# Patient Record
Sex: FEMALE | Race: WHITE | NOT HISPANIC OR LATINO | Employment: OTHER | URBAN - METROPOLITAN AREA
[De-identification: names, ages, dates, MRNs, and addresses within clinical notes are randomized per-mention and may not be internally consistent; named-entity substitution may affect disease eponyms.]

---

## 2016-12-06 LAB
CHOL/HDLC RATIO: 3.5
CHOLEST SERPL-MSCNC: 226 MG/DL (ref 0–200)
HDLC SERPL-MCNC: 64 MG/DL
LDLC SERPL CALC-MCNC: 130 MG/DL
NON HDL CHOL. (LDL+VLDL): 162
TRIGL SERPL-MCNC: 159 MG/DL

## 2017-04-10 ENCOUNTER — TELEPHONE (OUTPATIENT)
Dept: FAMILY MEDICINE | Facility: CLINIC | Age: 72
End: 2017-04-10

## 2017-04-10 NOTE — TELEPHONE ENCOUNTER
----- Message from Sneha Hernadez sent at 4/10/2017  1:35 PM CDT -----  Contact: diana   Wants orders for labs faxed to [facility] or pt for upcoming  physical   Call back [phone]

## 2017-04-11 ENCOUNTER — PATIENT OUTREACH (OUTPATIENT)
Dept: ADMINISTRATIVE | Facility: HOSPITAL | Age: 72
End: 2017-04-11

## 2017-04-11 NOTE — LETTER
April 11, 2017    Tiffanie Black  [address]  [facility] [address]             [facility]  [address]  [facility] [address]  Phone: [phone] Dear Mrs. Black:    [facility] is committed to your overall health.  To help you get the most out of each of your visits, we will review your information to make sure you are up to date on all of your recommended tests and/or procedures.      Dr. Dionisio Soares is a new provider to us and we may not have your complete medical records on file here at [facility].  We have requested his records from the [facility].  The records we have received so far show that you may be due for your fasting cholesterol labs, a mammogram, colon cancer screening, hepatitis C screening, and possibly some immunizations (flu, pneumonia, and shingles).    Medicare does not cover all immunizations to be given in the clinic.  Check your benefits to ensure that you do not need to receive your immunizations at the pharmacy.    If you have had any of the above done at another facility, please bring the records or information with you so that your record at [facility] will be complete.    If you are currently taking medication, please bring it with you to your appointment for review.    Also, if you have any type of Advanced Directives, please bring them with you to your office visit so we may scan them into your chart.    If you have any questions or concerns, please don't hesitate to call.    Thank you for letting us care for you,  Amena Robins LPN Clinical Care Coordinator  [facility] [place]  [phone]

## 2017-04-17 RX ORDER — MONTELUKAST SODIUM 10 MG/1
10 TABLET ORAL NIGHTLY
COMMUNITY
End: 2017-04-25

## 2017-04-17 RX ORDER — CHOLECALCIFEROL (VITAMIN D3) 50 MCG
2000 TABLET ORAL DAILY
COMMUNITY

## 2017-04-17 RX ORDER — GLUCOSAMINE HCL 500 MG
100 TABLET ORAL DAILY
COMMUNITY
End: 2020-07-07

## 2017-04-17 RX ORDER — IBUPROFEN 200 MG
1200 CAPSULE ORAL DAILY
COMMUNITY
End: 2021-03-29

## 2017-04-17 RX ORDER — GUAIFENESIN 600 MG/1
400 TABLET, EXTENDED RELEASE ORAL 2 TIMES DAILY
COMMUNITY
End: 2018-08-21

## 2017-04-25 ENCOUNTER — OFFICE VISIT (OUTPATIENT)
Dept: FAMILY MEDICINE | Facility: CLINIC | Age: 72
End: 2017-04-25
Payer: MEDICARE

## 2017-04-25 VITALS
TEMPERATURE: 98 F | SYSTOLIC BLOOD PRESSURE: 125 MMHG | HEART RATE: 73 BPM | OXYGEN SATURATION: 94 % | HEIGHT: 64 IN | DIASTOLIC BLOOD PRESSURE: 80 MMHG | BODY MASS INDEX: 22.43 KG/M2 | WEIGHT: 131.38 LBS

## 2017-04-25 DIAGNOSIS — E78.2 MIXED HYPERLIPIDEMIA: ICD-10-CM

## 2017-04-25 DIAGNOSIS — E53.8 VITAMIN B12 DEFICIENCY: ICD-10-CM

## 2017-04-25 DIAGNOSIS — J30.2 SEASONAL ALLERGIC RHINITIS, UNSPECIFIED ALLERGIC RHINITIS TRIGGER: ICD-10-CM

## 2017-04-25 DIAGNOSIS — E55.9 VITAMIN D INSUFFICIENCY: ICD-10-CM

## 2017-04-25 DIAGNOSIS — R03.0 PRE-HYPERTENSION: ICD-10-CM

## 2017-04-25 DIAGNOSIS — E03.9 HYPOTHYROIDISM, UNSPECIFIED TYPE: ICD-10-CM

## 2017-04-25 DIAGNOSIS — M85.80 OSTEOPENIA, UNSPECIFIED LOCATION: ICD-10-CM

## 2017-04-25 DIAGNOSIS — R73.01 IMPAIRED FASTING GLUCOSE: ICD-10-CM

## 2017-04-25 PROCEDURE — 99213 OFFICE O/P EST LOW 20 MIN: CPT | Mod: PBBFAC,PO | Performed by: INTERNAL MEDICINE

## 2017-04-25 PROCEDURE — 99999 PR PBB SHADOW E&M-EST. PATIENT-LVL III: CPT | Mod: PBBFAC,,, | Performed by: INTERNAL MEDICINE

## 2017-04-25 PROCEDURE — 99214 OFFICE O/P EST MOD 30 MIN: CPT | Mod: S$PBB,,, | Performed by: INTERNAL MEDICINE

## 2017-04-25 RX ORDER — CETIRIZINE HYDROCHLORIDE 10 MG/1
10 TABLET ORAL NIGHTLY
COMMUNITY
End: 2018-01-19

## 2017-04-25 RX ORDER — UBIDECARENONE 75 MG
500 CAPSULE ORAL DAILY
COMMUNITY

## 2017-04-25 NOTE — MR AVS SNAPSHOT
[facility]  [address]  [place] [address]  Phone: [phone]  Fax: [phone]                  Tiffanie Black   2017 10:40 AM   Office Visit    Description:  Female : 1945   Provider:  Dionisio Soares MD   Department:  [facility]           Reason for Visit     Establish Care           Diagnoses this Visit        Comments    Mixed hyperlipidemia         Hypothyroidism, unspecified type         Impaired fasting glucose         Osteopenia, unspecified location         Vitamin D insufficiency         Vitamin B12 deficiency                To Do List           Future Appointments        Provider Department Dept Phone    2017 1:00 PM Dionisio Soares MD [facility] [phone]      Goals (5 Years of Data)     None      Follow-Up and Disposition     Return in about 6 weeks (around 2017) for Reassessment and go over her labs.    Follow-up and Disposition History      [facility]     [facility] Care [facility] -  Assistance  Unless otherwise directed by your provider, please contact [facility], our nurse care line that is available for  assistance.     Registered nurses in the [facility] provide: appointment scheduling, clinical advisement, health education, and other advisory services.  Call: [phone] (toll free)               Medications           Message regarding Medications     Verify the changes and/or additions to your medication regime listed below are the same as discussed with your clinician today.  If any of these changes or additions are incorrect, please notify your healthcare provider.        STOP taking these medications     montelukast (SINGULAIR) 10 mg tablet Take 10 mg by mouth every evening.           Verify that the below list of medications is an accurate representation of the medications you are currently taking.  If none reported, the list may be blank.  "If incorrect, please contact your healthcare provider. Carry this list with you in case of emergency.           Current Medications     calcium citrate (CALCITRATE) 200 mg (950 mg) tablet Take 1,200 mg by mouth once daily.     cetirizine (ZYRTEC) 10 MG tablet Take 10 mg by mouth every evening.    coenzyme Q10 10 mg capsule Take 100 mg by mouth once daily.     cyanocobalamin 500 MCG tablet Take 500 mcg by mouth once daily.    guaifenesin (MUCINEX) 600 mg 12 hr tablet Take 400 mg by mouth 2 (two) times daily. As needed for congestion     KRILL OIL ORAL Take 500 mg by mouth once daily.     vitamin D 1000 units Tab Take 2,000 mg by mouth once daily.           Clinical Reference Information           Your Vitals Were     BP Pulse Temp Height Weight SpO2    125/80 (BP Location: Left arm, Patient Position: Sitting, BP Method: Manual) 73 98.4 °F (36.9 °C) (Oral) 5' 4" (1.626 m) 59.6 kg (131 lb 6.3 oz) 94%    BMI                22.55 kg/m2          Blood Pressure          Most Recent Value    BP  125/80      Allergies as of 4/25/2017     Bactrim [Sulfamethoxazole-trimethoprim]    Biaxin [Clarithromycin]      Immunizations Administered on Date of Encounter - 4/25/2017     None      Orders Placed During Today's Visit     Future Labs/Procedures Expected by Expires    Calcium  4/25/2017 6/24/2018    Glucose, fasting  4/25/2017 6/24/2018    Hemoglobin A1c  4/25/2017 6/24/2018    Magnesium  4/25/2017 6/24/2018    T3, free  4/25/2017 6/24/2018    T4, free  4/25/2017 6/24/2018    TSH  4/25/2017 6/24/2018    Urinalysis  4/25/2017 6/24/2018    Vitamin B12  4/25/2017 6/24/2018    Vitamin D  4/25/2017 6/24/2018      [facility] Sign-Up     Activating your [facility] account is as easy as 1-2-3!     1) Visit [email or web address], select Sign Up Now, enter this activation code and your date of birth, then select Next.  [email or web address]  Expires: 6/9/2017 10:33 AM      2) Create a username and password to use when you visit [facility] in the future " and select a security question in case you lose your password and select Next.    3) Enter your e-mail address and click Sign Up!    Additional Information  If you have questions, please e-mail [email or web address] or call [phone] to talk to our [facility] staff. Remember, [facility] is NOT to be used for urgent needs. For medical emergencies, dial 911.         Language Assistance Services     ATTENTION: Language assistance services are available, free of charge. Please call [phone].      ATENCIÓN: Si habla español, tiene a lozada disposición servicios gratuitos de asistencia lingüística. Llame al [phone].     CHÚ Ý: N?u b?n nói Ti?ng Vi?t, có các d?ch v? h? tr? ngôn ng? mi?n phí dành cho b?n. G?i s? [phone].         [facility] complies with applicable Federal civil rights laws and does not discriminate on the basis of race, color, national origin, age, disability, or sex.

## 2017-04-25 NOTE — PROGRESS NOTES
Subjective:       Patient ID: Tiffanie Black is a 71 y.o. female.    Chief Complaint: Establish Care    HPI patient's a very pleasant 71-year-old established lady patient of mine here to get re-established with me at [facility].  Last documented office visit 12/1/16 with me.  Running problems at that time included mixed hyperlipidemia, impaired fasting glucose, osteopenia with vitamin D insufficiency, hypothyroidism, and B12 deficiency.  Hemoglobin A1c on 12/1/16 was 5.1.  UA dipstick on same date was clear with negative nitrite negative blood negative glucose and negative protein.  Labs reviewed.  Last DEXA 10/31/16 femoral neck T score remains -1.3 recommended repeat in 2 years.  11/29/16 lipid panel revealed the following results: Cholesterol 226, HDL 64, triglyceride 159, and .  Patient was advised to adhere to a low-fat high-fiber diet better.    11/11/16 labs revealed 25-hydroxy vitamin D 39; CBC within normal limits; borderline TSH 2.83; free T4 0.7; free T3 2.4; LFTs were normal; fasting blood sugar was 106, slightly elevated; GFR was estimated at 83; with a BUN 19 and creatinine 0.73; potassium was 3.9; iron/ ferritin levels were normal; B12 was 362; patient has started  500 µg per day of B12.    Review of Systems   Constitutional: Negative for appetite change, fatigue, fever and unexpected weight change.   HENT:         history of seasonal ALLERGIES / perennial ALLERGIES   Eyes: Negative for discharge and itching.   Respiratory: Negative for cough, chest tightness, shortness of breath and wheezing.    Cardiovascular: Negative for chest pain, palpitations and leg swelling.   Gastrointestinal: Negative for abdominal distention, abdominal pain, blood in stool, constipation, diarrhea, nausea and vomiting.        Denies melena as well   Endocrine:        HLP; hypothyroid. IFBS   Genitourinary: Negative for dysuria, hematuria and urgency.   Musculoskeletal: Positive for arthralgias.  "Negative for myalgias.        LBP; has seen Dr Gamble in [place]; switched to Dr Teresa; arthritis w spondylosis; surg declined by pt. Conservative mgt. Scoliosis; has had injections patricio once.   Skin: Negative for rash.   Allergic/Immunologic: Positive for environmental allergies. Negative for food allergies and immunocompromised state.        Denies seasonal or perennial ALLERGIES.   Neurological: Negative for tremors, seizures, syncope and weakness.   Hematological: Negative for adenopathy. Does not bruise/bleed easily.   Psychiatric/Behavioral:        Denies anxiety or depression       Objective:      Vitals:    04/25/17 1055   BP: 125/80   BP Location: Left arm   Patient Position: Sitting   BP Method: Manual   Pulse: 73   Temp: 98.4 °F (36.9 °C)   TempSrc: Oral   SpO2: (!) 94%   Weight: 59.6 kg (131 lb 6.3 oz)   Height: 5' 4" (1.626 m)     Body mass index is 22.55 kg/m².    Physical Exam   Constitutional: She is oriented to person, place, and time. She appears well-developed and well-nourished.   HENT:   Head: Normocephalic and atraumatic.   Eyes: EOM are normal.   Neck: Normal range of motion. Neck supple. No thyromegaly present.   No carotid bruits heard.   Cardiovascular: Normal rate, regular rhythm and normal heart sounds.  Exam reveals no gallop.    No murmur heard.  Pulmonary/Chest: Effort normal and breath sounds normal. No respiratory distress. She has no wheezes. She has no rales.   Abdominal: Soft. Bowel sounds are normal. She exhibits no distension. There is no tenderness. There is no rebound and no guarding.   Musculoskeletal: Normal range of motion. She exhibits no edema.   Lymphadenopathy:     She has no cervical adenopathy.   Neurological: She is alert and oriented to person, place, and time.   Moves all 4 extremities fine.   Skin: No rash noted.   Psychiatric: She has a normal mood and affect. Her behavior is normal. Thought content normal.   Vitals reviewed.      Assessment:       1. Mixed " hyperlipidemia    2. Hypothyroidism, unspecified type    3. Impaired fasting glucose    4. Osteopenia, unspecified location    5. Vitamin D insufficiency    6. Vitamin B12 deficiency     7.     Seasonal ALLERGIES   Plan:       Mixed hyperlipidemia. Maintain low fat high fiber diet, exercise regularly. Cont krill oil.          Hypothyroidism, unspecified type; cont present regimen dosing.  -     TSH; Future; Expected date: 4/25/17  -     T4, free; Future; Expected date: 4/25/17  -     T3, free; Future; Expected date: 4/25/17    Impaired fasting glucose. Exercise weight bearing  -     Hemoglobin A1c; Future; Expected date: 4/25/17  -     Urinalysis; Future; Expected date: 4/25/17  -     Glucose, fasting; Future; Expected date: 4/25/17    Osteopenia, unspecified location; weight bearing exercise. Cont ca w vit D supplements.         DEXA due after 10/31/18.  -     Vitamin D; Future; Expected date: 4/25/17  -     Calcium; Future; Expected date: 4/25/17  -     Magnesium; Future; Expected date: 4/25/17    Vitamin D insufficiency; cont same regimen.  -     Vitamin D; Future; Expected date: 4/25/17  -     Calcium; Future; Expected date: 4/25/17    Vitamin B12 deficiency; cont 500 mcg a day.         -     Vitamin B12; Future; Expected date: 4/25/17    Seasonal ALLERGIES; uses Zyrtec and Mucinex

## 2017-04-27 PROBLEM — R03.0 PRE-HYPERTENSION: Status: ACTIVE | Noted: 2017-04-27

## 2017-05-24 PROBLEM — C50.812 MALIGNANT NEOPLASM OF OVERLAPPING SITES OF LEFT FEMALE BREAST: Status: ACTIVE | Noted: 2017-05-24

## 2017-05-30 ENCOUNTER — PATIENT OUTREACH (OUTPATIENT)
Dept: ADMINISTRATIVE | Facility: HOSPITAL | Age: 72
End: 2017-05-30

## 2017-05-30 NOTE — LETTER
May 30, 2017    Tiffanie LaguerreJovitatyra Black  [address]  [facility] [address]             [facility]  [address]  [facility] [address]  Phone: [phone] Dear Mrs. Black:    [facility] committed to your overall health.  To help you get the most out of each of your visits, we will review your information to make sure you are up to date on all of your recommended tests and/or procedures.      Dr. Dionisio Soares has found that you may be due for colon cancer screening, hepatitis C screening, and possibly pneumonia and shingles immunizations.     Medicare does not cover all immunizations to be given in the clinic.  Check your benefits to ensure that you do not need to receive your immunizations at the pharmacy.    If you have had any of the above done at another facility, please bring the records or information with you so that your record at [facility] will be complete.  If you would like to schedule any of these, please contact me.    If you are currently taking medication, please bring it with you to your appointment for review.    Also, if you have any type of Advanced Directives, please bring them with you to your office visit so we may scan them into your chart.    If you have any questions or concerns, please don't hesitate to call.    Thank you for letting us care for you,  Amena Robins LPN Clinical Care Coordinator  [facility] [place]  [phone]

## 2017-06-09 ENCOUNTER — PATIENT OUTREACH (OUTPATIENT)
Dept: ADMINISTRATIVE | Facility: HOSPITAL | Age: 72
End: 2017-06-09

## 2017-06-09 NOTE — LETTER
June 9, 2017    Tiffanie LaguerreJovitatyra Black  [address]  [facility] [address]             [facility]  [address]  [facility] [address]  Phone: [phone] Dear Mrs. Black:    [facility] committed to your overall health.  To help you get the most out of each of your visits, we will review your information to make sure you are up to date on all of your recommended tests and/or procedures.      Dr. Dionisio Soares has found that you may be due for colon cancer screening, hepatitis C screening, and possibly pneumonia and shingles immunizations.     Medicare does not cover all immunizations to be given in the clinic.  Check your benefits to ensure that you do not need to receive your immunizations at the pharmacy.    If you have had any of the above done at another facility, please bring the records or information with you so that your record at [facility] will be complete.  If you would like to schedule any of these, please contact me.    If you are currently taking medication, please bring it with you to your appointment for review.    Also, if you have any type of Advanced Directives, please bring them with you to your office visit so we may scan them into your chart.    If you have any questions or concerns, please don't hesitate to call.    Thank you for letting us care for you,  Amena Robins LPN Clinical Care Coordinator  [facility] [place] [place]  [phone]

## 2017-06-23 ENCOUNTER — OFFICE VISIT (OUTPATIENT)
Dept: FAMILY MEDICINE | Facility: CLINIC | Age: 72
End: 2017-06-23
Payer: MEDICARE

## 2017-06-23 VITALS
HEART RATE: 68 BPM | HEIGHT: 64 IN | WEIGHT: 131.31 LBS | BODY MASS INDEX: 22.42 KG/M2 | DIASTOLIC BLOOD PRESSURE: 78 MMHG | TEMPERATURE: 98 F | SYSTOLIC BLOOD PRESSURE: 130 MMHG

## 2017-06-23 DIAGNOSIS — R73.01 IMPAIRED FASTING GLUCOSE: ICD-10-CM

## 2017-06-23 DIAGNOSIS — Z86.010 HISTORY OF COLON POLYPS: ICD-10-CM

## 2017-06-23 DIAGNOSIS — M85.80 OSTEOPENIA, UNSPECIFIED LOCATION: ICD-10-CM

## 2017-06-23 DIAGNOSIS — E53.8 VITAMIN B12 DEFICIENCY: ICD-10-CM

## 2017-06-23 DIAGNOSIS — E03.9 HYPOTHYROIDISM, UNSPECIFIED TYPE: ICD-10-CM

## 2017-06-23 DIAGNOSIS — J30.2 SEASONAL ALLERGIC RHINITIS, UNSPECIFIED ALLERGIC RHINITIS TRIGGER: ICD-10-CM

## 2017-06-23 DIAGNOSIS — E55.9 VITAMIN D INSUFFICIENCY: ICD-10-CM

## 2017-06-23 DIAGNOSIS — E78.2 MIXED HYPERLIPIDEMIA: Primary | ICD-10-CM

## 2017-06-23 DIAGNOSIS — R03.0 PRE-HYPERTENSION: ICD-10-CM

## 2017-06-23 PROCEDURE — 99214 OFFICE O/P EST MOD 30 MIN: CPT | Mod: S$PBB,,, | Performed by: INTERNAL MEDICINE

## 2017-06-23 PROCEDURE — 1126F AMNT PAIN NOTED NONE PRSNT: CPT | Mod: ,,, | Performed by: INTERNAL MEDICINE

## 2017-06-23 PROCEDURE — 99213 OFFICE O/P EST LOW 20 MIN: CPT | Mod: PBBFAC,PO | Performed by: INTERNAL MEDICINE

## 2017-06-23 PROCEDURE — 1159F MED LIST DOCD IN RCRD: CPT | Mod: ,,, | Performed by: INTERNAL MEDICINE

## 2017-06-23 PROCEDURE — 99999 PR PBB SHADOW E&M-EST. PATIENT-LVL III: CPT | Mod: PBBFAC,,, | Performed by: INTERNAL MEDICINE

## 2017-06-23 RX ORDER — LEVOTHYROXINE SODIUM 50 UG/1
50 TABLET ORAL DAILY
Qty: 30 TABLET | Refills: 2 | Status: SHIPPED | OUTPATIENT
Start: 2017-06-23 | End: 2017-09-19

## 2017-06-23 NOTE — PROGRESS NOTES
Subjective:       Patient ID: Tiffanie Black is a 71 y.o. female.    Chief Complaint: Follow-up    HPI  Here today for reassessment and to go over her labs. For HLP, on low fat high fiber diet; not exercising; on krill oil. Following thyroid levels w early hypothyroidism; on no meds. Pre-HTN; watches her salt intake. Seasonal allergies have been doing ok w meds. Brittle nails; no hair loss; some fatigue; dry skin, but no constipation. Labs reviewed w pt at length.  DEXA scan noted 10/31/16, needs repeat in 2 years    Review of Systems   Constitutional: Negative for appetite change, fever and unexpected weight change.   HENT: Positive for sinus pressure. Negative for postnasal drip and rhinorrhea.          history of seasonal ALLERGIES / perennial ALLERGIES; mucinex, zyrtec, flonase; all help.   Eyes: Negative for discharge and itching.   Respiratory: Negative for cough, chest tightness, shortness of breath and wheezing.    Cardiovascular: Negative for chest pain, palpitations and leg swelling.   Gastrointestinal: Negative for abdominal distention, abdominal pain, blood in stool, constipation, diarrhea, nausea and vomiting.        Denies melena as well   Endocrine: Negative for polydipsia, polyphagia and polyuria.   Genitourinary: Negative for dysuria, hematuria and urgency.   Musculoskeletal: Negative for arthralgias and myalgias.   Skin: Negative for rash.   Allergic/Immunologic: Positive for environmental allergies. Negative for food allergies and immunocompromised state.        Denies seasonal or perennial ALLERGIES.   Neurological: Negative for tremors, seizures and syncope.   Hematological: Negative for adenopathy. Does not bruise/bleed easily.   Psychiatric/Behavioral:        Denies anxiety or depression       Objective:      Vitals:    06/23/17 1311   BP: 130/78   BP Location: Right arm   Patient Position: Sitting   Pulse: 68   Temp: 98.3 °F (36.8 °C)   TempSrc: Oral   Weight: 59.5 kg (131 lb 4.5 oz)  "  Height: 5' 4" (1.626 m)     Body mass index is 22.53 kg/m².    Physical Exam   Constitutional: She is oriented to person, place, and time. She appears well-developed and well-nourished.   HENT:   Head: Normocephalic and atraumatic.   Eyes: EOM are normal.   Neck: Normal range of motion. Neck supple. No thyromegaly present.   No carotid bruits heard.   Cardiovascular: Normal rate, regular rhythm and normal heart sounds.  Exam reveals no gallop.    No murmur heard.  Pulmonary/Chest: Effort normal and breath sounds normal. No respiratory distress. She has no wheezes. She has no rales.   Abdominal: Soft. Bowel sounds are normal. She exhibits no distension. There is no tenderness. There is no rebound and no guarding.   Musculoskeletal: Normal range of motion. She exhibits no edema.   Lymphadenopathy:     She has no cervical adenopathy.   Neurological: She is alert and oriented to person, place, and time.   Moves all 4 extremities fine.   Skin: No rash noted.   Psychiatric: She has a normal mood and affect. Her behavior is normal. Thought content normal.   Vitals reviewed.      Assessment:       1. Mixed hyperlipidemia    2. Hypothyroidism, unspecified type    3. Pre-hypertension    4. Impaired fasting glucose    5. History of colon polyps    6. Seasonal allergic rhinitis, unspecified allergic rhinitis trigger    7. Osteopenia, unspecified location    8. Vitamin D insufficiency    9. Vitamin B12 deficiency        Plan:       Mixed hyperlipidemia. Maintain low fat high fiber diet, exercise regularly.    Hypothyroidism, unspecified type; following TFT's periodically.  Begin levothyroxine 50 µg a day due to hypothyroidism and some mild symptoms.    Pre-hypertension. Limit salt intake; follow BP periodically.    Impaired fasting glucose. Exercise recommended with weight reduction and low carb diet; we'll follow hemoglobin A1c's with you periodically.    History of colon polyps; TC 7/29/15 divert ds desc/sigmoid; 5 yr f/u; " due 2020. High fiber.    Seasonal allergic rhinitis, unspecified allergic rhinitis trigger; meds control.    Osteopenia, unspecified location. Weight bearing exercises needed 5x a week;, continue calcium and vit D supplements. DEXA 10/31/18 due;    Vitamin D insufficiency; and tinea vitamin D supplementation; will check levels    Vitamin B12 deficiency; on B12 supplements 500 µg daily

## 2017-06-23 NOTE — PATIENT INSTRUCTIONS
Add levothyroxine 50 mcg a day; obtain labs few days before follow-up. Fast the night before. Labs at [facility].

## 2017-07-01 ENCOUNTER — TELEPHONE (OUTPATIENT)
Dept: FAMILY MEDICINE | Facility: CLINIC | Age: 72
End: 2017-07-01

## 2017-07-02 NOTE — TELEPHONE ENCOUNTER
Informed patient she is to take the Synthroid/levothyroxine on an empty stomach with no other food or medicines till at least an hour later.

## 2017-09-05 ENCOUNTER — PATIENT OUTREACH (OUTPATIENT)
Dept: ADMINISTRATIVE | Facility: HOSPITAL | Age: 72
End: 2017-09-05

## 2017-09-05 NOTE — LETTER
September 11, 2017    Tiffanie Jovita Wayne  [address]  [facility] [address]             [facility]  [address]  [facility] [address]  Phone: [phone] Dear Mrs. Black:    We have tried to reach you by mychart unsuccessfully.    [facility] committed to your overall health.  To help you get the most out of each of your visits, we will review your information to make sure you are up to date on all of your recommended tests and/or procedures.       Dr. Dionisio Soares has found that you may be due for hepatitis C screening and possibly a shingles immunization.     Medicare does not cover all immunizations to be given in the clinic.  Check your benefits to ensure that you do not need to receive your immunizations at the pharmacy.     If you have had any of the above done at another facility, please bring the records or information with you so that your record at [facility] will be complete.  If you would like to schedule any of these, please contact me.     If you are currently taking medication, please bring it with you to your appointment for review.     Also, if you have any type of Advanced Directives, please bring them with you to your office visit so we may scan them into your chart.      If you have any questions or concerns, please don't hesitate to call.    Thank you for letting us care for you,  Amena Robins LPN Clinical Care Coordinator  [facility] [place] [facility] [place]  [phone]

## 2017-09-19 ENCOUNTER — OFFICE VISIT (OUTPATIENT)
Dept: FAMILY MEDICINE | Facility: CLINIC | Age: 72
End: 2017-09-19
Payer: MEDICARE

## 2017-09-19 VITALS
BODY MASS INDEX: 21.76 KG/M2 | TEMPERATURE: 98 F | HEART RATE: 84 BPM | DIASTOLIC BLOOD PRESSURE: 80 MMHG | SYSTOLIC BLOOD PRESSURE: 122 MMHG | WEIGHT: 127.44 LBS | OXYGEN SATURATION: 97 % | HEIGHT: 64 IN

## 2017-09-19 DIAGNOSIS — E55.9 VITAMIN D INSUFFICIENCY: ICD-10-CM

## 2017-09-19 DIAGNOSIS — E53.8 VITAMIN B12 DEFICIENCY: ICD-10-CM

## 2017-09-19 DIAGNOSIS — R03.0 PRE-HYPERTENSION: ICD-10-CM

## 2017-09-19 DIAGNOSIS — E78.2 MIXED HYPERLIPIDEMIA: Primary | ICD-10-CM

## 2017-09-19 DIAGNOSIS — E03.9 HYPOTHYROIDISM, UNSPECIFIED TYPE: ICD-10-CM

## 2017-09-19 DIAGNOSIS — M85.859 OSTEOPENIA OF THIGH, UNSPECIFIED LATERALITY: ICD-10-CM

## 2017-09-19 DIAGNOSIS — R73.01 IMPAIRED FASTING GLUCOSE: ICD-10-CM

## 2017-09-19 PROCEDURE — 99214 OFFICE O/P EST MOD 30 MIN: CPT | Mod: S$PBB,,, | Performed by: INTERNAL MEDICINE

## 2017-09-19 PROCEDURE — 99999 PR PBB SHADOW E&M-EST. PATIENT-LVL III: CPT | Mod: PBBFAC,,, | Performed by: INTERNAL MEDICINE

## 2017-09-19 PROCEDURE — 1126F AMNT PAIN NOTED NONE PRSNT: CPT | Mod: ,,, | Performed by: INTERNAL MEDICINE

## 2017-09-19 PROCEDURE — 1159F MED LIST DOCD IN RCRD: CPT | Mod: ,,, | Performed by: INTERNAL MEDICINE

## 2017-09-19 PROCEDURE — 99213 OFFICE O/P EST LOW 20 MIN: CPT | Mod: PBBFAC,PN | Performed by: INTERNAL MEDICINE

## 2017-09-19 RX ORDER — PRAVASTATIN SODIUM 20 MG/1
20 TABLET ORAL DAILY
Qty: 30 TABLET | Refills: 3 | Status: SHIPPED | OUTPATIENT
Start: 2017-09-19 | End: 2018-03-19

## 2017-09-19 RX ORDER — LEVOTHYROXINE SODIUM 75 UG/1
TABLET ORAL
Qty: 25 TABLET | Refills: 1 | Status: SHIPPED | OUTPATIENT
Start: 2017-09-19 | End: 2018-01-19

## 2017-09-19 RX ORDER — LEVOTHYROXINE SODIUM 50 UG/1
TABLET ORAL
Qty: 60 TABLET | Refills: 1 | Status: SHIPPED | OUTPATIENT
Start: 2017-09-19 | End: 2018-01-19

## 2017-09-19 NOTE — PROGRESS NOTES
Subjective:       Patient ID: Tiffanie Black is a 72 y.o. female.    Chief Complaint: follow up labs    HPI  Here to go over her labs. HLP; on low fat high fiber diet mostly. On krill oil.  10 year risk of cardiovascular diseases elevated at 10%;  with normal being less than 7.5%.  Discussed with patient at length.  We'll add 20 mg of pravastatin daily at bedtime to her medical regimen.  IFBS: exercise just starting. Hypothyroidism: takes her thyroid med appropriately. Pre-HTN: watches her salt intake; /80 in office. Avr same at home w SBP high 120's/upper 70's. Osteopenia; needs weight bearing exercises 5x a week for min 25-30 minutes; cont on ca w vit D , and separate vit D supplements. DEXA 10/31/16 reviewed w pt; 2 yr f/u.  Total time 1123 through 12:00 noon; various diagnosis is addressed as well as plan of care.    Review of Systems   Constitutional: Negative for activity change, appetite change, fever and unexpected weight change.   HENT: Negative for hearing loss, rhinorrhea and trouble swallowing.         Eyes history of seasonal ALLERGIES or perennial ALLERGIES   Eyes: Negative for discharge and visual disturbance.   Respiratory: Negative for cough, chest tightness, shortness of breath and wheezing.    Cardiovascular: Negative for chest pain, palpitations and leg swelling.   Gastrointestinal: Negative for abdominal distention, abdominal pain, blood in stool, constipation, diarrhea, nausea and vomiting.        Denies melena as well   Endocrine: Negative for polydipsia, polyphagia and polyuria.   Genitourinary: Negative for difficulty urinating, dysuria, hematuria and menstrual problem.   Musculoskeletal: Negative for arthralgias, joint swelling, myalgias and neck pain.   Skin: Negative for rash.   Allergic/Immunologic: Positive for environmental allergies. Negative for food allergies.        Denies seasonal or perennial ALLERGIES.   Neurological: Negative for tremors, seizures, syncope,  "weakness and headaches.   Hematological: Negative for adenopathy. Does not bruise/bleed easily.   Psychiatric/Behavioral: Negative for confusion and dysphoric mood.         Anxiety/situational lately; no depression.       Objective:      Vitals:    09/19/17 1051   BP: 122/80   BP Location: Right arm   Patient Position: Sitting   BP Method: Medium (Manual)   Pulse: 84   Temp: 98 °F (36.7 °C)   TempSrc: Oral   SpO2: 97%   Weight: 57.8 kg (127 lb 6.8 oz)   Height: 5' 4" (1.626 m)     Body mass index is 21.87 kg/m².    Physical Exam   Constitutional: She is oriented to person, place, and time. She appears well-developed and well-nourished.   HENT:   Head: Normocephalic and atraumatic.   Eyes: EOM are normal.   Neck: Normal range of motion. Neck supple. No thyromegaly present.   No carotid bruits heard.   Cardiovascular: Normal rate, regular rhythm and normal heart sounds.  Exam reveals no gallop.    No murmur heard.  Pulmonary/Chest: Effort normal and breath sounds normal. No respiratory distress. She has no wheezes. She has no rales.   Abdominal: Soft. Bowel sounds are normal. She exhibits no distension. There is no tenderness. There is no rebound and no guarding.   Musculoskeletal: Normal range of motion. She exhibits no edema.   Lymphadenopathy:     She has no cervical adenopathy.   Neurological: She is alert and oriented to person, place, and time.   Moves all 4 extremities fine.   Skin: No rash noted.   Psychiatric: She has a normal mood and affect. Her behavior is normal. Thought content normal.   Vitals reviewed.      Assessment:       1. Mixed hyperlipidemia    2. Pre-hypertension    3. Hypothyroidism, unspecified type    4. Impaired fasting glucose    5. Vitamin B12 deficiency    6. Osteopenia of thigh, unspecified laterality    7. Vitamin D insufficiency        Plan:       Mixed hyperlipidemia.  Increase cardiovascular disease risk at 10%; normal being less than 7.5%.  Moderate high intensity statin therapy " recommended however patient does not desire to take higher statin therapy.  Maintain low fat high fiber diet, exercise regularly. Add pravastatin 20 mg q hs; adhere to diet better; limit dairy more as well.    Pre-hypertension. Maintain < 2 Gm Na a day diet, and monitor BP at home; keep a log.    Hypothyroidism, unspecified type; decrease to levothyroxine 37.5 mcg MWF; 50 mcg other days.    Impaired fasting glucose. Exercise recommended  and low carb diet; we'll follow hemoglobin A1c's with you periodically.    Vitamin B12 deficiency; cont present dosing.    Osteopenia of thigh, unspecified laterality; Weight bearing exercises 5x a wee needed; continue calcium and vit D supplements. DEXA due 10/31/18    Vitamin D insufficiency; and continue calcium with vitamin D supplementation; 25-hydroxy vitamin D level ordered for follow-up

## 2017-09-19 NOTE — PATIENT INSTRUCTIONS
Mixed hyperlipidemia. Maintain low fat high fiber diet, exercise regularly. Add pravastatin 20 mg q hs; adhere to diet better; limit dairy more as well.    Pre-hypertension. Maintain < 2 Gm Na a day diet, and monitor BP at home; keep a log.    Hypothyroidism, unspecified type; decrease to levothyroxine 37.5 mcg MWF; 50 mcg other days.    Impaired fasting glucose. Exercise recommended  and low carb diet; we'll follow hemoglobin A1c's with you periodically.    Vitamin B12 deficiency; cont present dosing.    Osteopenia of thigh, unspecified laterality; Weight bearing exercises 5x a wee needed; continue calcium and vit D supplements. DEXA due 10/31/18    Vitamin D insufficiency    Return to office in 3 months; labs 10-14 days prior to follow up. Keep her follow up w Dr Morel next May, 2018 for breast cancer following. Sees also dr Moore in June yearly w [place]. And f/u.

## 2017-09-22 DIAGNOSIS — Z11.59 NEED FOR HEPATITIS C SCREENING TEST: ICD-10-CM

## 2018-01-04 ENCOUNTER — PATIENT MESSAGE (OUTPATIENT)
Dept: FAMILY MEDICINE | Facility: CLINIC | Age: 73
End: 2018-01-04

## 2018-01-08 ENCOUNTER — TELEPHONE (OUTPATIENT)
Dept: FAMILY MEDICINE | Facility: CLINIC | Age: 73
End: 2018-01-08

## 2018-01-08 NOTE — TELEPHONE ENCOUNTER
----- Message from Thea Herrera sent at 1/8/2018  2:59 PM CST -----  Contact: [phone]  Patient is requesting a call back from the nurse, in regards to her lab work.    Please call the patient upon request at phone number [phone].

## 2018-01-15 ENCOUNTER — LAB VISIT (OUTPATIENT)
Dept: LAB | Facility: HOSPITAL | Age: 73
End: 2018-01-15
Attending: INTERNAL MEDICINE
Payer: MEDICARE

## 2018-01-15 DIAGNOSIS — R73.01 IMPAIRED FASTING GLUCOSE: ICD-10-CM

## 2018-01-15 DIAGNOSIS — E03.9 HYPOTHYROIDISM, UNSPECIFIED TYPE: ICD-10-CM

## 2018-01-15 LAB
ESTIMATED AVG GLUCOSE: 100 MG/DL
GLUCOSE SERPL-MCNC: 93 MG/DL
HBA1C MFR BLD HPLC: 5.1 %
T3FREE SERPL-MCNC: 2.6 PG/ML
T4 FREE SERPL-MCNC: 1.06 NG/DL
TSH SERPL DL<=0.005 MIU/L-ACNC: 0.8 UIU/ML

## 2018-01-15 PROCEDURE — 83036 HEMOGLOBIN GLYCOSYLATED A1C: CPT

## 2018-01-15 PROCEDURE — 36415 COLL VENOUS BLD VENIPUNCTURE: CPT | Mod: PN

## 2018-01-15 PROCEDURE — 84439 ASSAY OF FREE THYROXINE: CPT

## 2018-01-15 PROCEDURE — 84481 FREE ASSAY (FT-3): CPT

## 2018-01-15 PROCEDURE — 82947 ASSAY GLUCOSE BLOOD QUANT: CPT

## 2018-01-15 PROCEDURE — 84443 ASSAY THYROID STIM HORMONE: CPT

## 2018-01-19 ENCOUNTER — OFFICE VISIT (OUTPATIENT)
Dept: FAMILY MEDICINE | Facility: CLINIC | Age: 73
End: 2018-01-19
Payer: MEDICARE

## 2018-01-19 VITALS
DIASTOLIC BLOOD PRESSURE: 80 MMHG | HEART RATE: 88 BPM | WEIGHT: 131.38 LBS | BODY MASS INDEX: 22.43 KG/M2 | TEMPERATURE: 98 F | SYSTOLIC BLOOD PRESSURE: 122 MMHG | OXYGEN SATURATION: 95 % | HEIGHT: 64 IN

## 2018-01-19 DIAGNOSIS — E55.9 VITAMIN D INSUFFICIENCY: ICD-10-CM

## 2018-01-19 DIAGNOSIS — E53.8 VITAMIN B12 DEFICIENCY: ICD-10-CM

## 2018-01-19 DIAGNOSIS — M85.859 OSTEOPENIA OF THIGH, UNSPECIFIED LATERALITY: ICD-10-CM

## 2018-01-19 DIAGNOSIS — R73.01 IMPAIRED FASTING GLUCOSE: ICD-10-CM

## 2018-01-19 DIAGNOSIS — E78.2 MIXED HYPERLIPIDEMIA: ICD-10-CM

## 2018-01-19 DIAGNOSIS — E03.9 HYPOTHYROIDISM, UNSPECIFIED TYPE: Primary | ICD-10-CM

## 2018-01-19 DIAGNOSIS — R03.0 PRE-HYPERTENSION: ICD-10-CM

## 2018-01-19 PROCEDURE — 99999 PR PBB SHADOW E&M-EST. PATIENT-LVL III: CPT | Mod: PBBFAC,,, | Performed by: INTERNAL MEDICINE

## 2018-01-19 PROCEDURE — 99213 OFFICE O/P EST LOW 20 MIN: CPT | Mod: PBBFAC,PN | Performed by: INTERNAL MEDICINE

## 2018-01-19 PROCEDURE — 99214 OFFICE O/P EST MOD 30 MIN: CPT | Mod: S$PBB,,, | Performed by: INTERNAL MEDICINE

## 2018-01-19 RX ORDER — LEVOTHYROXINE SODIUM 75 UG/1
TABLET ORAL
Qty: 30 TABLET | Refills: 2 | Status: SHIPPED | OUTPATIENT
Start: 2018-01-19 | End: 2018-09-07 | Stop reason: SDUPTHER

## 2018-01-19 NOTE — PATIENT INSTRUCTIONS
Hypothyroidism, unspecified type; same thyroid dosing; TFT in 2 mos; wants to decrease to 1/2 of 75 mcg daily.     Impaired fasting glucose. Exercise recommended with weight reduction and low carb diet; we'll follow hemoglobin A1c's with you periodically.    Mixed hyperlipidemia. Maintain low fat high fiber diet, exercise regularly. Pravastatin    Osteopenia of thigh, unspecified laterality. Weight bearing exercises, continue calcium and vit D supplements. DEXA at 10/31/18    Vitamin D insufficiency; cont supplements; therapeutic.    Pre-hypertension. Maintain < 2 Gm Na a day diet, and monitor BP at pharmacist.; keep a log.

## 2018-01-19 NOTE — PROGRESS NOTES
Subjective:       Patient ID: Tiffanie Black is a 72 y.o. female.    Chief Complaint: Follow-up (labs)    HPI  Overall doing fine. HLP; on low fat high fiber diet or tries; tolerates pravastatin fine and krill oil. Hypothyroid: takes appropriately. Pre-HTN: watches her salt intake. BP avr 125-130/70-80. 122/80 here. Osteopenia; not exercising; takes her calcium and vit D though.  12/28/17 vitamin D level returned back 43.  4/7/14 DEXA scan as compared to DEXA scan from 10/31/16: Lumbar spine T score improved to 0.2 up from -1.3.  Femoral T-scores remained same -1.3 both times.  Would repeat her DEXA scan after 10/31/18.  Labs reviewed discussed with patient at length as well as DEXA scans.  Total time to 2:50 p.m. to 3:25 PM.    Review of Systems   Constitutional: Negative for activity change and unexpected weight change.   HENT: Positive for congestion. Negative for hearing loss, rhinorrhea and trouble swallowing.         Saw ENT dr Sunshine today; to have nasal polyps removed.   Eyes: Negative for discharge and visual disturbance.   Respiratory: Negative for cough, chest tightness, shortness of breath and wheezing.    Cardiovascular: Negative for chest pain and palpitations.   Gastrointestinal: Negative for abdominal pain, blood in stool, constipation, diarrhea, nausea and vomiting.   Endocrine: Negative for polydipsia and polyuria.   Genitourinary: Negative for difficulty urinating, dysuria, hematuria and menstrual problem.   Musculoskeletal: Positive for arthralgias. Negative for joint swelling, myalgias and neck pain.        Lower back pain and upper occasionally. Has had lower back injections.   Skin: Negative for rash.   Allergic/Immunologic: Negative for environmental allergies and food allergies.   Neurological: Negative for syncope, weakness and headaches.   Hematological: Positive for adenopathy. Does not bruise/bleed easily.        L submandibular; ENT to address Dr Sunshine. Discussed w pt.  "  Psychiatric/Behavioral: Negative for confusion and dysphoric mood.        Denies anxiety or depression.       Objective:      Vitals:    01/19/18 1335   BP: 122/80   BP Location: Right arm   Patient Position: Sitting   BP Method: Medium (Manual)   Pulse: 88   Temp: 97.8 °F (36.6 °C)   TempSrc: Oral   SpO2: 95%   Weight: 59.6 kg (131 lb 6.3 oz)   Height: 5' 4" (1.626 m)     Body mass index is 22.55 kg/m².    Physical Exam   Constitutional: She is oriented to person, place, and time. She appears well-developed and well-nourished.   HENT:   Head: Normocephalic and atraumatic.   Eyes: EOM are normal.   Neck: Normal range of motion. Neck supple. No thyromegaly present.   Cardiovascular: Normal rate, regular rhythm and normal heart sounds.  Exam reveals no gallop.    No murmur heard.  Pulmonary/Chest: Effort normal and breath sounds normal. No respiratory distress. She has no wheezes. She has no rales.   Abdominal: Soft. Bowel sounds are normal. She exhibits no distension. There is no tenderness. There is no rebound and no guarding.   Musculoskeletal: Normal range of motion. She exhibits no edema.   Lymphadenopathy:     She has no cervical adenopathy.   Neurological: She is alert and oriented to person, place, and time.   Moves all 4 extremities fine.   Skin: No rash noted.   R submandibular LN palp; her ENT to address. No palp ant cervical or post cervical LN's appreciated.Throat pink; to slightly inflamed; no sore throat; no abnormal oral lesions noted.   Psychiatric: She has a normal mood and affect. Her behavior is normal. Thought content normal.   Vitals reviewed.      Assessment:       1. Hypothyroidism, unspecified type    2. Impaired fasting glucose    3. Mixed hyperlipidemia    4. Osteopenia of thigh, unspecified laterality    5. Vitamin D insufficiency    6. Pre-hypertension    7. Vitamin B12 deficiency        Plan:       Hypothyroidism, unspecified type; same thyroid dosing; TFT in 2 mos; wants to decrease " levothyroxin to 1/2 of 75 mcg daily.     Impaired fasting glucose. Exercise recommended with  low carb diet; we'll follow hemoglobin A1c's with you periodically.  Recent hemoglobin A1c showing good control at 5.1 with a fasting blood sugar 93 recently.    Mixed hyperlipidemia. Maintain low fat high fiber diet, exercise regularly.  Continue Pravastatin and krill oil; needs to watch her diet closer and exercise regularly for better lipid management.    Osteopenia of thigh, unspecified laterality. Weight bearing exercises, continue calcium and vit D supplements. DEXA at 10/31/18    Vitamin D insufficiency; cont supplements; therapeutic.    Pre-hypertension. Maintain < 2 Gm Na a day diet, and monitor BP at pharmacist.; keep a log.    B12 deficiency: B12 levels have been therapeutic on recent dosing was September 2017 levels; continue present supplements;     Check B12 levels on follow-up

## 2018-01-24 PROBLEM — J33.9 POLYP, NASAL: Status: ACTIVE | Noted: 2018-01-24

## 2018-03-12 ENCOUNTER — LAB VISIT (OUTPATIENT)
Dept: LAB | Facility: HOSPITAL | Age: 73
End: 2018-03-12
Attending: INTERNAL MEDICINE
Payer: MEDICARE

## 2018-03-12 DIAGNOSIS — E78.2 MIXED HYPERLIPIDEMIA: ICD-10-CM

## 2018-03-12 DIAGNOSIS — E03.9 HYPOTHYROIDISM, UNSPECIFIED TYPE: ICD-10-CM

## 2018-03-12 DIAGNOSIS — M85.859 OSTEOPENIA OF THIGH, UNSPECIFIED LATERALITY: ICD-10-CM

## 2018-03-12 LAB
ALBUMIN SERPL BCP-MCNC: 3.6 G/DL
ALP SERPL-CCNC: 75 U/L
ALT SERPL W/O P-5'-P-CCNC: 19 U/L
AST SERPL-CCNC: 22 U/L
BILIRUB DIRECT SERPL-MCNC: 0.2 MG/DL
BILIRUB SERPL-MCNC: 0.5 MG/DL
CHOLEST SERPL-MCNC: 225 MG/DL
CHOLEST/HDLC SERPL: 3.7 {RATIO}
HDLC SERPL-MCNC: 61 MG/DL
HDLC SERPL: 27.1 %
LDLC SERPL CALC-MCNC: 140.2 MG/DL
MAGNESIUM SERPL-MCNC: 2 MG/DL
NONHDLC SERPL-MCNC: 164 MG/DL
PROT SERPL-MCNC: 6.7 G/DL
T3FREE SERPL-MCNC: 2.5 PG/ML
T4 FREE SERPL-MCNC: 0.94 NG/DL
TRIGL SERPL-MCNC: 119 MG/DL
TSH SERPL DL<=0.005 MIU/L-ACNC: 1.36 UIU/ML

## 2018-03-12 PROCEDURE — 80076 HEPATIC FUNCTION PANEL: CPT

## 2018-03-12 PROCEDURE — 84439 ASSAY OF FREE THYROXINE: CPT

## 2018-03-12 PROCEDURE — 84443 ASSAY THYROID STIM HORMONE: CPT

## 2018-03-12 PROCEDURE — 83735 ASSAY OF MAGNESIUM: CPT

## 2018-03-12 PROCEDURE — 84481 FREE ASSAY (FT-3): CPT

## 2018-03-12 PROCEDURE — 36415 COLL VENOUS BLD VENIPUNCTURE: CPT | Mod: PN

## 2018-03-12 PROCEDURE — 80061 LIPID PANEL: CPT

## 2018-03-19 ENCOUNTER — OFFICE VISIT (OUTPATIENT)
Dept: FAMILY MEDICINE | Facility: CLINIC | Age: 73
End: 2018-03-19
Payer: MEDICARE

## 2018-03-19 VITALS
WEIGHT: 128.88 LBS | HEIGHT: 64 IN | DIASTOLIC BLOOD PRESSURE: 82 MMHG | BODY MASS INDEX: 22 KG/M2 | HEART RATE: 82 BPM | OXYGEN SATURATION: 96 % | SYSTOLIC BLOOD PRESSURE: 130 MMHG | TEMPERATURE: 98 F

## 2018-03-19 DIAGNOSIS — R73.01 IMPAIRED FASTING GLUCOSE: ICD-10-CM

## 2018-03-19 DIAGNOSIS — E55.9 VITAMIN D INSUFFICIENCY: ICD-10-CM

## 2018-03-19 DIAGNOSIS — E03.9 HYPOTHYROIDISM, UNSPECIFIED TYPE: ICD-10-CM

## 2018-03-19 DIAGNOSIS — R03.0 PRE-HYPERTENSION: ICD-10-CM

## 2018-03-19 DIAGNOSIS — M85.859 OSTEOPENIA OF THIGH, UNSPECIFIED LATERALITY: ICD-10-CM

## 2018-03-19 DIAGNOSIS — E78.2 MIXED HYPERLIPIDEMIA: Primary | ICD-10-CM

## 2018-03-19 PROCEDURE — 99214 OFFICE O/P EST MOD 30 MIN: CPT | Mod: PBBFAC,PN | Performed by: INTERNAL MEDICINE

## 2018-03-19 PROCEDURE — 99214 OFFICE O/P EST MOD 30 MIN: CPT | Mod: S$PBB,,, | Performed by: INTERNAL MEDICINE

## 2018-03-19 PROCEDURE — 99999 PR PBB SHADOW E&M-EST. PATIENT-LVL IV: CPT | Mod: PBBFAC,,, | Performed by: INTERNAL MEDICINE

## 2018-03-19 RX ORDER — PRAVASTATIN SODIUM 40 MG/1
40 TABLET ORAL NIGHTLY
Qty: 90 TABLET | Refills: 1 | Status: SHIPPED | OUTPATIENT
Start: 2018-03-19 | End: 2018-09-20 | Stop reason: SDUPTHER

## 2018-03-19 NOTE — PATIENT INSTRUCTIONS
Mixed hyperlipidemia; increase pravastatin to 40 mg each night. Maintain low fat high fiber diet, exercise regularly.    Hypothyroidism, unspecified type; same thyroid dosing    Impaired fasting glucose. Exercise recommended with weight reduction and low carb diet; we'll follow hemoglobin A1c's with you periodically.    Osteopenia of thigh, unspecified laterality. Weight bearing exercises, continue calcium and vit D supplements. DEXA scan at 2 yr intervals as needed.      DEXA due 10/31/18;     Vitamin D insufficiency; levels on f/u.    Pre-hypertension. Maintain < 2 Gm Na a day diet, and monitor BP periodically.; keep a log.

## 2018-03-19 NOTE — PROGRESS NOTES
Subjective:       Patient ID: Tiffanie Black is a 72 y.o. female.    Chief Complaint: Follow-up (labs)    HPI  Overall she's been doing well; has her nasal polyp patricio removed in end of January; Dr Olson ENT. Mix HLP; on low fat high fiber diet mostly, tolerates pravastatin fine. 10 yr ASCVD risk elevated at present at 11.0%. IFBS: watches her carbs mostly; hypothyroidism; takes her thyroid supplements appropriately. Osteopenia: weight bearing 3-4x a week; goal is 5x a wek at 30 min each. Takes her calcium w vit D. Pre-HTN: watches her salt intake of note; /82. 10/31/16 DEXA reviewed w pt; needs repeat in 2 yrs. Labs reviewed w pt; labs ordered for f/u; total time: 0168-1820 am; >50% time, spent, on counseling, review, and discussion.    Review of Systems   Constitutional: Negative for appetite change, fever and unexpected weight change.   HENT: Negative for congestion, postnasal drip, rhinorrhea and sinus pressure.    Eyes: Negative for discharge and itching.   Respiratory: Negative for cough, chest tightness, shortness of breath and wheezing.    Cardiovascular: Negative for chest pain, palpitations and leg swelling.   Gastrointestinal: Negative for abdominal distention, abdominal pain, blood in stool, constipation, diarrhea, nausea and vomiting.   Endocrine: Negative for polydipsia, polyphagia and polyuria.   Genitourinary: Negative for dysuria and hematuria.   Musculoskeletal: Negative for arthralgias and myalgias.   Skin: Negative for rash.   Allergic/Immunologic: Negative for environmental allergies and food allergies.   Neurological: Negative for tremors, seizures and syncope.   Hematological: Negative for adenopathy. Does not bruise/bleed easily.   Psychiatric/Behavioral:        Denies anxiety or depression       Objective:      Vitals:    03/19/18 1000   BP: 130/82   BP Location: Right arm   Patient Position: Sitting   BP Method: Medium (Manual)   Pulse: 82   Temp: 97.6 °F (36.4 °C)  "  TempSrc: Oral   SpO2: 96%   Weight: 58.4 kg (128 lb 13.7 oz)   Height: 5' 4" (1.626 m)     Body mass index is 22.12 kg/m².    Physical Exam   Constitutional: She is oriented to person, place, and time. She appears well-developed and well-nourished.   HENT:   Head: Normocephalic and atraumatic.   Eyes: EOM are normal.   Neck: Normal range of motion. Neck supple. No thyromegaly present.   Cardiovascular: Normal rate, regular rhythm and normal heart sounds.  Exam reveals no gallop.    No murmur heard.  Pulmonary/Chest: Effort normal and breath sounds normal. No respiratory distress. She has no wheezes. She has no rales.   Abdominal: Soft. Bowel sounds are normal. She exhibits no distension. There is no tenderness. There is no rebound and no guarding.   Musculoskeletal: Normal range of motion. She exhibits no edema.   Lymphadenopathy:     She has no cervical adenopathy.   Neurological: She is alert and oriented to person, place, and time.   Moves all 4 extremities fine.   Skin: No rash noted.   Psychiatric: She has a normal mood and affect. Her behavior is normal. Thought content normal.   Vitals reviewed.      Assessment:       1. Mixed hyperlipidemia    2. Hypothyroidism, unspecified type    3. Impaired fasting glucose    4. Osteopenia of thigh, unspecified laterality    5. Vitamin D insufficiency    6. Pre-hypertension        Plan:       Mixed hyperlipidemia; increase pravastatin to 40 mg each night. Maintain low fat high fiber diet, exercise regularly. Watch her diet closer.    Hypothyroidism, unspecified type; same thyroid dosing    Impaired fasting glucose. Exercise recommended with weight reduction and low carb diet; we'll follow hemoglobin A1c's with you periodically.    Osteopenia of thigh, unspecified laterality. Weight bearing exercises, continue calcium and vit D supplements. DEXA scan at 2 yr intervals as needed.      DEXA due 10/31/18;     Vitamin D insufficiency; levels on f/u.    Pre-hypertension. " Maintain < 2 Gm Na a day diet, and monitor BP periodically.; keep a log.

## 2018-07-12 ENCOUNTER — LAB VISIT (OUTPATIENT)
Dept: LAB | Facility: HOSPITAL | Age: 73
End: 2018-07-12
Attending: INTERNAL MEDICINE
Payer: MEDICARE

## 2018-07-12 DIAGNOSIS — M85.859 OSTEOPENIA OF THIGH, UNSPECIFIED LATERALITY: ICD-10-CM

## 2018-07-12 DIAGNOSIS — E55.9 VITAMIN D INSUFFICIENCY: ICD-10-CM

## 2018-07-12 DIAGNOSIS — E53.8 VITAMIN B12 DEFICIENCY: ICD-10-CM

## 2018-07-12 DIAGNOSIS — E78.2 MIXED HYPERLIPIDEMIA: ICD-10-CM

## 2018-07-12 DIAGNOSIS — R73.01 IMPAIRED FASTING GLUCOSE: ICD-10-CM

## 2018-07-12 LAB
25(OH)D3+25(OH)D2 SERPL-MCNC: 50 NG/ML
ALBUMIN SERPL BCP-MCNC: 4 G/DL
ALP SERPL-CCNC: 71 U/L
ALT SERPL W/O P-5'-P-CCNC: 22 U/L
ANION GAP SERPL CALC-SCNC: 11 MMOL/L
AST SERPL-CCNC: 21 U/L
BILIRUB SERPL-MCNC: 0.5 MG/DL
BUN SERPL-MCNC: 14 MG/DL
CALCIUM SERPL-MCNC: 9.8 MG/DL
CHLORIDE SERPL-SCNC: 103 MMOL/L
CHOLEST SERPL-MCNC: 215 MG/DL
CHOLEST/HDLC SERPL: 3.5 {RATIO}
CO2 SERPL-SCNC: 27 MMOL/L
CREAT SERPL-MCNC: 0.8 MG/DL
EST. GFR  (AFRICAN AMERICAN): >60 ML/MIN/1.73 M^2
EST. GFR  (NON AFRICAN AMERICAN): >60 ML/MIN/1.73 M^2
GLUCOSE SERPL-MCNC: 87 MG/DL
HDLC SERPL-MCNC: 61 MG/DL
HDLC SERPL: 28.4 %
LDLC SERPL CALC-MCNC: 127.6 MG/DL
MAGNESIUM SERPL-MCNC: 2 MG/DL
NONHDLC SERPL-MCNC: 154 MG/DL
POTASSIUM SERPL-SCNC: 3.7 MMOL/L
PROT SERPL-MCNC: 7.2 G/DL
SODIUM SERPL-SCNC: 141 MMOL/L
TRIGL SERPL-MCNC: 132 MG/DL
VIT B12 SERPL-MCNC: 531 PG/ML

## 2018-07-12 PROCEDURE — 80053 COMPREHEN METABOLIC PANEL: CPT

## 2018-07-12 PROCEDURE — 36415 COLL VENOUS BLD VENIPUNCTURE: CPT | Mod: PN

## 2018-07-12 PROCEDURE — 82306 VITAMIN D 25 HYDROXY: CPT

## 2018-07-12 PROCEDURE — 80061 LIPID PANEL: CPT

## 2018-07-12 PROCEDURE — 82607 VITAMIN B-12: CPT

## 2018-07-12 PROCEDURE — 83735 ASSAY OF MAGNESIUM: CPT

## 2018-07-19 ENCOUNTER — OFFICE VISIT (OUTPATIENT)
Dept: FAMILY MEDICINE | Facility: CLINIC | Age: 73
End: 2018-07-19
Payer: MEDICARE

## 2018-07-19 VITALS
WEIGHT: 131.19 LBS | BODY MASS INDEX: 22.52 KG/M2 | DIASTOLIC BLOOD PRESSURE: 84 MMHG | SYSTOLIC BLOOD PRESSURE: 130 MMHG | TEMPERATURE: 98 F | HEART RATE: 74 BPM

## 2018-07-19 DIAGNOSIS — E55.9 VITAMIN D INSUFFICIENCY: ICD-10-CM

## 2018-07-19 DIAGNOSIS — E78.2 MIXED HYPERLIPIDEMIA: Primary | ICD-10-CM

## 2018-07-19 DIAGNOSIS — E03.9 HYPOTHYROIDISM, UNSPECIFIED TYPE: ICD-10-CM

## 2018-07-19 DIAGNOSIS — Z78.0 POST-MENOPAUSAL: ICD-10-CM

## 2018-07-19 DIAGNOSIS — K21.9 GASTROESOPHAGEAL REFLUX DISEASE, ESOPHAGITIS PRESENCE NOT SPECIFIED: ICD-10-CM

## 2018-07-19 DIAGNOSIS — R07.9 CHEST PAIN, UNSPECIFIED TYPE: ICD-10-CM

## 2018-07-19 DIAGNOSIS — M85.859 OSTEOPENIA OF THIGH, UNSPECIFIED LATERALITY: ICD-10-CM

## 2018-07-19 DIAGNOSIS — J30.2 SEASONAL ALLERGIC RHINITIS, UNSPECIFIED TRIGGER: ICD-10-CM

## 2018-07-19 DIAGNOSIS — R73.01 IMPAIRED FASTING GLUCOSE: ICD-10-CM

## 2018-07-19 PROCEDURE — 93005 ELECTROCARDIOGRAM TRACING: CPT | Mod: PBBFAC,PN | Performed by: INTERNAL MEDICINE

## 2018-07-19 PROCEDURE — 99214 OFFICE O/P EST MOD 30 MIN: CPT | Mod: S$PBB,,, | Performed by: INTERNAL MEDICINE

## 2018-07-19 PROCEDURE — 99999 PR PBB SHADOW E&M-EST. PATIENT-LVL III: CPT | Mod: PBBFAC,,, | Performed by: INTERNAL MEDICINE

## 2018-07-19 PROCEDURE — 99213 OFFICE O/P EST LOW 20 MIN: CPT | Mod: PBBFAC,PN,25 | Performed by: INTERNAL MEDICINE

## 2018-07-19 PROCEDURE — 93010 ELECTROCARDIOGRAM REPORT: CPT | Mod: S$PBB,,, | Performed by: INTERNAL MEDICINE

## 2018-07-19 RX ORDER — OMEPRAZOLE 40 MG/1
40 CAPSULE, DELAYED RELEASE ORAL DAILY
Qty: 30 CAPSULE | Refills: 3 | Status: SHIPPED | OUTPATIENT
Start: 2018-07-19 | End: 2019-03-29 | Stop reason: ALTCHOICE

## 2018-07-19 NOTE — PATIENT INSTRUCTIONS
Mixed hyperlipidemia. Maintain low fat high fiber diet, exercise regularly. Cont pravastatin    Chest pain, unspecified type; ASA 81 mg a day; cardiology consult Dr Bautista to r/o CAD  -     IN OFFICE EKG 12-LEAD (to Muse)    Hypothyroidism, unspecified type; same thyroid dosing.     Impaired fasting glucose. Exercise recommended with weight reduction and low carb diet; we'll follow hemoglobin A1c's with you periodically.    Osteopenia of thigh, unspecified laterality. Weight bearing exercises, continue calcium and vit D supplements. DEXA scan at 2 yr intervals as needed.Due 10/31/18.    Vitamin D insufficiency; levels therapeutic; cont supplements.    Seasonal allergic rhinitis, unspecified trigger; claritin and flonase for congestion

## 2018-07-19 NOTE — PROGRESS NOTES
Subjective:       Patient ID: Tiffanie Black is a 72 y.o. female.    Chief Complaint: Follow-up    HPI  Overall doing fine.  Mix HLP: on low fat high fiber diet; tolerates pravastatin and krill oil fine.  IFBS: watches her carbs; exercises 3x a week; goal 5 at 30 minutes. FBS 87.  Hypothyroidism; takes her supplements appropriately.  Osteopenia; weight bearing exercises; takes her calcium w vit D. DEXA 10/31/18 Lsp T score  0.2, improved from -1.3;   femoral -1.3; previous same. Repeat in 2 yrs.   Chest pain; lower substernal nonradiating; no n/v, sweating or associated SOB w it. Lasts 5 minutes. Last 4 mos 3x note. Last episode 2 weeks ago. Nothing worsens; Rest leads to clears spontaneously. Belching a lot but not bad by pt's account. No anxiety issues. EST 5-6 yrs w Dr Combs; routine screen was negative. [facility] neg for CAD.  Will consult [facility] doctor Dr Bautista to r/o CAD; place ppt on PPI as well.  In office EKG showing no evidence of acute ischemia.    Review of Systems   Constitutional: Negative for activity change and unexpected weight change.   HENT: Negative for hearing loss, rhinorrhea and trouble swallowing.         Seasonal allergies.   Eyes: Negative for discharge and visual disturbance.   Respiratory: Positive for chest tightness. Negative for wheezing.    Cardiovascular: Positive for chest pain. Negative for palpitations.   Gastrointestinal: Negative for blood in stool, constipation, diarrhea and vomiting.   Endocrine: Negative for polydipsia and polyuria.   Genitourinary: Negative for difficulty urinating, dysuria, hematuria and menstrual problem.   Musculoskeletal: Positive for arthralgias and neck pain. Negative for joint swelling.   Allergic/Immunologic: Negative for environmental allergies and food allergies.   Neurological: Negative for weakness and headaches.   Psychiatric/Behavioral: Negative for confusion and dysphoric mood.       Objective:      Vitals:    07/19/18 1026    BP: 130/84   Pulse: 74   Temp: 97.8 °F (36.6 °C)   Weight: 59.5 kg (131 lb 2.8 oz)     Body mass index is 22.52 kg/m².    Physical Exam   Constitutional: She is oriented to person, place, and time. She appears well-developed and well-nourished.   HENT:   Head: Normocephalic and atraumatic.   Eyes: EOM are normal.   Neck: Normal range of motion. Neck supple. No thyromegaly present.   Cardiovascular: Normal rate, regular rhythm and normal heart sounds.  Exam reveals no gallop.    No murmur heard.  Pulmonary/Chest: Effort normal and breath sounds normal. No respiratory distress. She has no wheezes. She has no rales.   No costochondral tenderness to palp.   Abdominal: Soft. Bowel sounds are normal. She exhibits no distension. There is no tenderness. There is no rebound and no guarding.   Musculoskeletal: Normal range of motion. She exhibits no edema.   Lymphadenopathy:     She has no cervical adenopathy.   Neurological: She is alert and oriented to person, place, and time.   Moves all 4 extremities fine.   Skin: No rash noted.   Psychiatric: She has a normal mood and affect. Her behavior is normal. Thought content normal.   Vitals reviewed.      Assessment:       1. Mixed hyperlipidemia    2. Chest pain, unspecified type    3. Gastroesophageal reflux disease, esophagitis presence not specified    4. Hypothyroidism, unspecified type    5. Impaired fasting glucose    6. Osteopenia of thigh, unspecified laterality    7. Vitamin D insufficiency    8. Seasonal allergic rhinitis, unspecified trigger    9. Post-menopausal        Plan:       Mixed hyperlipidemia. Maintain low fat high fiber diet, exercise regularly. Cont pravastatin and Krill oil.    Chest pain, unspecified type; ASA 81 mg a day; cardiology consult Dr Bautista to r/o CAD; to ER if worsens.   -     IN OFFICE EKG 12-LEAD (to Muse)    GERD.No bedtime snacks. Omeprazole 40 mg p.o. daily; reassess on f/u.    Hypothyroidism, unspecified type; same thyroid dosing.   Thyroid function test on follow-up for reassessment    Impaired fasting glucose. Exercise recommended with  low carb diet; we'll follow hemoglobin A1c's with you periodically.    Osteopenia of thigh, unspecified laterality. Weight bearing exercises, continue calcium and vit D supplements. DEXA scan at 2 yr intervals as needed. Due 10/31/18.    Vitamin D insufficiency; levels therapeutic; cont supplements.  Postmenopausal.    Seasonal allergic rhinitis, unspecified trigger; claritin and flonase for congestion

## 2018-08-21 ENCOUNTER — CLINICAL SUPPORT (OUTPATIENT)
Dept: CARDIOLOGY | Facility: CLINIC | Age: 73
End: 2018-08-21
Attending: INTERNAL MEDICINE
Payer: MEDICARE

## 2018-08-21 ENCOUNTER — OFFICE VISIT (OUTPATIENT)
Dept: CARDIOLOGY | Facility: CLINIC | Age: 73
End: 2018-08-21
Payer: MEDICARE

## 2018-08-21 VITALS — WEIGHT: 132.06 LBS | HEIGHT: 64 IN | BODY MASS INDEX: 22.55 KG/M2 | HEART RATE: 72 BPM

## 2018-08-21 VITALS
HEART RATE: 91 BPM | BODY MASS INDEX: 22.55 KG/M2 | DIASTOLIC BLOOD PRESSURE: 88 MMHG | WEIGHT: 132.06 LBS | SYSTOLIC BLOOD PRESSURE: 136 MMHG | HEIGHT: 64 IN

## 2018-08-21 DIAGNOSIS — R03.0 PRE-HYPERTENSION: Primary | ICD-10-CM

## 2018-08-21 DIAGNOSIS — E78.2 MIXED HYPERLIPIDEMIA: ICD-10-CM

## 2018-08-21 DIAGNOSIS — R07.2 PRECORDIAL PAIN: ICD-10-CM

## 2018-08-21 DIAGNOSIS — R03.0 PRE-HYPERTENSION: ICD-10-CM

## 2018-08-21 PROCEDURE — 99204 OFFICE O/P NEW MOD 45 MIN: CPT | Mod: S$PBB,,, | Performed by: INTERNAL MEDICINE

## 2018-08-21 PROCEDURE — 93351 STRESS TTE COMPLETE: CPT | Mod: PBBFAC,PO | Performed by: INTERNAL MEDICINE

## 2018-08-21 PROCEDURE — 99999 PR PBB SHADOW E&M-EST. PATIENT-LVL II: CPT | Mod: PBBFAC,,,

## 2018-08-21 PROCEDURE — 99999 PR PBB SHADOW E&M-EST. PATIENT-LVL III: CPT | Mod: PBBFAC,,, | Performed by: INTERNAL MEDICINE

## 2018-08-21 PROCEDURE — 99212 OFFICE O/P EST SF 10 MIN: CPT | Mod: PBBFAC,27,PO,25

## 2018-08-21 PROCEDURE — 99213 OFFICE O/P EST LOW 20 MIN: CPT | Mod: PBBFAC,PO,25 | Performed by: INTERNAL MEDICINE

## 2018-08-21 RX ORDER — KETOCONAZOLE 20 MG/ML
SHAMPOO, SUSPENSION TOPICAL
Refills: 11 | COMMUNITY
Start: 2018-07-19

## 2018-08-21 RX ORDER — CLOBETASOL PROPIONATE 0.5 MG/G
AEROSOL, FOAM TOPICAL
Refills: 11 | COMMUNITY
Start: 2018-08-01

## 2018-08-21 NOTE — PROGRESS NOTES
Subjective:    Patient ID:  Tiffanie Black is a 73 y.o. female who presents for evaluation of chest discomfort    HPI  She comes with chest pain on/off for the last few weeks    Review of Systems   Constitution: Negative for decreased appetite, weakness, malaise/fatigue, weight gain and weight loss.   Cardiovascular: Positive for chest pain. Negative for dyspnea on exertion, leg swelling, palpitations and syncope.   Respiratory: Negative for cough and shortness of breath.    Gastrointestinal: Negative.    All other systems reviewed and are negative.       Objective:      Physical Exam   Constitutional: She is oriented to person, place, and time. She appears well-developed and well-nourished.   HENT:   Head: Normocephalic.   Eyes: Pupils are equal, round, and reactive to light.   Neck: Normal range of motion. Neck supple. No JVD present. Carotid bruit is not present. No thyromegaly present.   Cardiovascular: Normal rate, regular rhythm, normal heart sounds, intact distal pulses and normal pulses. PMI is not displaced. Exam reveals no gallop.   No murmur heard.  Pulmonary/Chest: Effort normal and breath sounds normal.   Abdominal: Soft. Normal appearance. She exhibits no mass. There is no hepatosplenomegaly. There is no tenderness.   Musculoskeletal: Normal range of motion. She exhibits no edema.   Neurological: She is alert and oriented to person, place, and time. She has normal strength and normal reflexes. No sensory deficit.   Skin: Skin is warm and intact.   Psychiatric: She has a normal mood and affect.   Nursing note and vitals reviewed.        Assessment:       1. Pre-hypertension    2. Mixed hyperlipidemia    3. Precordial pain         Plan:     Stress echo; call with results

## 2018-08-21 NOTE — LETTER
August 21, 2018      Dionisio Soares MD  [address]  [facility] [address]           [facility]  [address] [facility] [address] [place] [address]  Phone: [phone]          Patient: Tiffanie Black   MR Number: [id number]   YOB: 1945   Date of Visit: 8/21/2018       Dear Dr. Dionisio Soares:    Thank you for referring Tiffanie Black to me for evaluation. Attached you will find relevant portions of my assessment and plan of care.    If you have questions, please do not hesitate to call me. I look forward to following Tiffanie Black along with you.    Sincerely,    Parrish Bautista MD    Enclosure  CC:  No Recipients    If you would like to receive this communication electronically, please contact [email or web address] or [phone] to request more information on [facility] access.    For providers and/or their staff who would like to refer a patient to [facility], please contact us through our one-stop-shop provider referral line, [facility], at [phone].    If you feel you have received this communication in error or would no longer like to receive these types of communications, please e-mail [email or web address]

## 2018-08-22 LAB
ASCENDING AORTA: 3.2 CM
BSA FOR ECHO PROCEDURE: 1.64 M2
CV ECHO LV RWT: 0.52 CM
CV STRESS BASE HR: 72
CVPEAKDIABP: 76
CVPEAKSYSBP: 185
CVRESTDIABP: 88
CVRESTSYSBP: 138
DOP CALC LVOT AREA: 3.3 CM2
DOP CALC LVOT DIAMETER: 2.05 CM
DOP CALC LVOT STROKE VOLUME: 61.76 CM3
DOP CALCLVOT PEAK VEL VTI: 18.72 CM
E WAVE DECELERATION TIME: 270.16 MSEC
E/A RATIO: 0.58
E/E' RATIO: 12.89
ECHO LV POSTERIOR WALL: 0.98 CM (ref 0.6–1.1)
FRACTIONAL SHORTENING: 39 % (ref 28–44)
INTERVENTRICULAR SEPTUM: 0.99 CM (ref 0.6–1.1)
IVRT: 0.13 MSEC
LA MAJOR: 4.72 CM
LA MINOR: 4.35 CM
LA WIDTH: 2.66 CM
LEFT ATRIUM SIZE: 3.34 CM
LEFT ATRIUM VOLUME INDEX: 20.8 ML/M2
LEFT ATRIUM VOLUME: 34.19 CM3
LEFT INTERNAL DIMENSION IN SYSTOLE: 2.32 CM (ref 2.1–4)
LEFT VENTRICLE MASS INDEX: 70 G/M2
LEFT VENTRICULAR INTERNAL DIMENSION IN DIASTOLE: 3.8 CM (ref 3.5–6)
LEFT VENTRICULAR MASS: 114.77 G
LV LATERAL E/E' RATIO: 11.6
LV SEPTAL E/E' RATIO: 14.5
MV PEAK A VEL: 1 M/S
MV PEAK E VEL: 0.58 M/S
MV STENOSIS PRESSURE HALF TIME: 78.35 MS
MV VALVE AREA P 1/2 METHOD: 2.81 CM2
PISA TR MAX VEL: 1.62 M/S
PULM VEIN S/D RATIO: 1.91
PV PEAK D VEL: 0.22 M/S
PV PEAK S VEL: 0.42 M/S
RA MAJOR: 4.39 CM
RA WIDTH: 2.59 CM
SINUS: 3.2 CM
STJ: 2.53 CM
STRESS ECHO POST ESTIMATED WORKLOAD: 8 METS
STRESS ECHO POST EXERCISE DUR MIN: 4 MIN
STRESS ECHO POST EXERCISE DUR SEC: 41
TDI LATERAL: 0.05
TDI SEPTAL: 0.04
TDI: 0.05
TR MAX PG: 10.5 MMHG

## 2018-09-07 ENCOUNTER — PATIENT MESSAGE (OUTPATIENT)
Dept: FAMILY MEDICINE | Facility: CLINIC | Age: 73
End: 2018-09-07

## 2018-09-07 DIAGNOSIS — E03.9 HYPOTHYROIDISM, UNSPECIFIED TYPE: ICD-10-CM

## 2018-09-08 RX ORDER — LEVOTHYROXINE SODIUM 75 UG/1
TABLET ORAL
Qty: 45 TABLET | Refills: 1 | Status: SHIPPED | OUTPATIENT
Start: 2018-09-08 | End: 2019-06-26 | Stop reason: SDUPTHER

## 2018-09-20 DIAGNOSIS — E78.2 MIXED HYPERLIPIDEMIA: ICD-10-CM

## 2018-09-20 RX ORDER — PRAVASTATIN SODIUM 40 MG/1
40 TABLET ORAL NIGHTLY
Qty: 90 TABLET | Refills: 1 | Status: SHIPPED | OUTPATIENT
Start: 2018-09-20 | End: 2019-04-24 | Stop reason: SDUPTHER

## 2019-01-04 ENCOUNTER — LAB VISIT (OUTPATIENT)
Dept: LAB | Facility: HOSPITAL | Age: 74
End: 2019-01-04
Attending: INTERNAL MEDICINE
Payer: MEDICARE

## 2019-01-04 DIAGNOSIS — E78.2 MIXED HYPERLIPIDEMIA: ICD-10-CM

## 2019-01-04 DIAGNOSIS — M85.859 OSTEOPENIA OF THIGH, UNSPECIFIED LATERALITY: ICD-10-CM

## 2019-01-04 DIAGNOSIS — R73.01 IMPAIRED FASTING GLUCOSE: ICD-10-CM

## 2019-01-04 DIAGNOSIS — E03.9 HYPOTHYROIDISM, UNSPECIFIED TYPE: ICD-10-CM

## 2019-01-04 DIAGNOSIS — E55.9 VITAMIN D INSUFFICIENCY: ICD-10-CM

## 2019-01-04 LAB
25(OH)D3+25(OH)D2 SERPL-MCNC: 45 NG/ML
ALBUMIN SERPL BCP-MCNC: 4.1 G/DL
ALP SERPL-CCNC: 69 U/L
ALT SERPL W/O P-5'-P-CCNC: 21 U/L
ANION GAP SERPL CALC-SCNC: 7 MMOL/L
AST SERPL-CCNC: 20 U/L
BILIRUB SERPL-MCNC: 0.5 MG/DL
BUN SERPL-MCNC: 17 MG/DL
CALCIUM SERPL-MCNC: 9.6 MG/DL
CHLORIDE SERPL-SCNC: 105 MMOL/L
CHOLEST SERPL-MCNC: 202 MG/DL
CHOLEST/HDLC SERPL: 3.2 {RATIO}
CO2 SERPL-SCNC: 29 MMOL/L
CREAT SERPL-MCNC: 0.7 MG/DL
EST. GFR  (AFRICAN AMERICAN): >60 ML/MIN/1.73 M^2
EST. GFR  (NON AFRICAN AMERICAN): >60 ML/MIN/1.73 M^2
ESTIMATED AVG GLUCOSE: 108 MG/DL
GLUCOSE SERPL-MCNC: 82 MG/DL
HBA1C MFR BLD HPLC: 5.4 %
HDLC SERPL-MCNC: 64 MG/DL
HDLC SERPL: 31.7 %
LDLC SERPL CALC-MCNC: 119.4 MG/DL
NONHDLC SERPL-MCNC: 138 MG/DL
POTASSIUM SERPL-SCNC: 4 MMOL/L
PROT SERPL-MCNC: 7.4 G/DL
SODIUM SERPL-SCNC: 141 MMOL/L
T3FREE SERPL-MCNC: 2.3 PG/ML
T4 FREE SERPL-MCNC: 0.78 NG/DL
TRIGL SERPL-MCNC: 93 MG/DL
TSH SERPL DL<=0.005 MIU/L-ACNC: 1.68 UIU/ML

## 2019-01-04 PROCEDURE — 80061 LIPID PANEL: CPT

## 2019-01-04 PROCEDURE — 84481 FREE ASSAY (FT-3): CPT

## 2019-01-04 PROCEDURE — 84443 ASSAY THYROID STIM HORMONE: CPT

## 2019-01-04 PROCEDURE — 83036 HEMOGLOBIN GLYCOSYLATED A1C: CPT

## 2019-01-04 PROCEDURE — 84439 ASSAY OF FREE THYROXINE: CPT

## 2019-01-04 PROCEDURE — 36415 COLL VENOUS BLD VENIPUNCTURE: CPT | Mod: PN

## 2019-01-04 PROCEDURE — 80053 COMPREHEN METABOLIC PANEL: CPT

## 2019-01-04 PROCEDURE — 82306 VITAMIN D 25 HYDROXY: CPT

## 2019-01-15 ENCOUNTER — OFFICE VISIT (OUTPATIENT)
Dept: FAMILY MEDICINE | Facility: CLINIC | Age: 74
End: 2019-01-15
Payer: MEDICARE

## 2019-01-15 VITALS
HEART RATE: 67 BPM | OXYGEN SATURATION: 97 % | WEIGHT: 130.31 LBS | HEIGHT: 64 IN | TEMPERATURE: 98 F | BODY MASS INDEX: 22.25 KG/M2 | SYSTOLIC BLOOD PRESSURE: 118 MMHG | RESPIRATION RATE: 17 BRPM | DIASTOLIC BLOOD PRESSURE: 80 MMHG

## 2019-01-15 DIAGNOSIS — R03.0 PRE-HYPERTENSION: ICD-10-CM

## 2019-01-15 DIAGNOSIS — G89.29 CHRONIC BILATERAL LOW BACK PAIN WITHOUT SCIATICA: ICD-10-CM

## 2019-01-15 DIAGNOSIS — M85.859 OSTEOPENIA OF THIGH, UNSPECIFIED LATERALITY: ICD-10-CM

## 2019-01-15 DIAGNOSIS — E55.9 VITAMIN D INSUFFICIENCY: ICD-10-CM

## 2019-01-15 DIAGNOSIS — R73.01 IMPAIRED FASTING GLUCOSE: ICD-10-CM

## 2019-01-15 DIAGNOSIS — E78.2 MIXED HYPERLIPIDEMIA: ICD-10-CM

## 2019-01-15 DIAGNOSIS — Z23 NEED FOR 23-POLYVALENT PNEUMOCOCCAL POLYSACCHARIDE VACCINE: ICD-10-CM

## 2019-01-15 DIAGNOSIS — M54.50 CHRONIC BILATERAL LOW BACK PAIN WITHOUT SCIATICA: ICD-10-CM

## 2019-01-15 DIAGNOSIS — E03.9 HYPOTHYROIDISM, UNSPECIFIED TYPE: Primary | ICD-10-CM

## 2019-01-15 PROBLEM — R07.2 PRECORDIAL PAIN: Status: RESOLVED | Noted: 2018-08-21 | Resolved: 2019-01-15

## 2019-01-15 PROBLEM — J33.9 POLYP, NASAL: Status: RESOLVED | Noted: 2018-01-24 | Resolved: 2019-01-15

## 2019-01-15 PROCEDURE — 99214 OFFICE O/P EST MOD 30 MIN: CPT | Mod: S$PBB,,, | Performed by: INTERNAL MEDICINE

## 2019-01-15 PROCEDURE — G0009 ADMIN PNEUMOCOCCAL VACCINE: HCPCS | Mod: PBBFAC,PN

## 2019-01-15 PROCEDURE — 99214 PR OFFICE/OUTPT VISIT, EST, LEVL IV, 30-39 MIN: ICD-10-PCS | Mod: S$PBB,,, | Performed by: INTERNAL MEDICINE

## 2019-01-15 PROCEDURE — 99999 PR PBB SHADOW E&M-EST. PATIENT-LVL III: ICD-10-PCS | Mod: PBBFAC,,, | Performed by: INTERNAL MEDICINE

## 2019-01-15 PROCEDURE — 99213 OFFICE O/P EST LOW 20 MIN: CPT | Mod: PBBFAC,PN | Performed by: INTERNAL MEDICINE

## 2019-01-15 PROCEDURE — 99999 PR PBB SHADOW E&M-EST. PATIENT-LVL III: CPT | Mod: PBBFAC,,, | Performed by: INTERNAL MEDICINE

## 2019-01-15 NOTE — PATIENT INSTRUCTIONS
Hypothyroidism, unspecified type; same thyroid dosing.  -     T4, free; Future; Expected date: 01/15/2019  -     T3, free; Future; Expected date: 01/15/2019  -     TSH; Future; Expected date: 01/15/2019    Mixed hyperlipidemia  -     Comprehensive metabolic panel; Future; Expected date: 01/15/2019  -     Lipid panel; Future; Expected date: 01/15/2019    Pre-hypertension. Maintain < 2 Gm Na a day diet, and monitor BP at home; keep a log.    Osteopenia of thigh, unspecified laterality; Weight bearing exercises, continue calcium and vit D supplements. DEXA scabn at 2 yr intervals as needed. DEXA due after 1/4/2021.  -     Vitamin D; Future; Expected date: 01/15/2019  -     Comprehensive metabolic panel; Future; Expected date: 01/15/2019  -     Magnesium; Future; Expected date: 01/15/2019    Vitamin D insufficiency; cont supplements.  -     Vitamin D; Future; Expected date: 01/15/2019  -     Comprehensive metabolic panel; Future; Expected date: 01/15/2019    Impaired fasting glucose. Exercise recommended with weight reduction and low carb diet; we'll follow hemoglobin A1c's with you periodically.  -     Hemoglobin A1c; Future; Expected date: 01/15/2019  -     Comprehensive metabolic panel; Future; Expected date: 01/15/2019    Need for 23-polyvalent pneumococcal polysaccharide vaccine  -     (In Office Administered) Pneumococcal Polysaccharide Vaccine (23 Valent) (SQ/IM)    Chronic bilateral low back pain without sciatica; rarely left sciatica; uses ibuprofen as needed for pain; recommend 400-600 mg 3x a day as needed for pain; Xrays w scoliosis and arthritis; also in Tsp as well w arthritic changes noted xray as well. Has seen Dr Teresa neurosurgery in past

## 2019-01-15 NOTE — PROGRESS NOTES
Subjective:       Patient ID: Tiffanie Black is a 73 y.o. female.    Chief Complaint: Results (lab work and routine follow up )    HPI  Overall doing fine.  Mx HLP: on low fat high fiber diet; tolerates krill oil and pravastatin. Improving lipids.  Hypothyroidism; takes her thyroid supplements appropriately.  Pre-HTN; limits her salt intake. /80. Monitor BP at pharmacist or home.  IFBS: watches her carbs; FBS 82 w HgA1C 5.4.   Osteopenia; wt bearing exercises goal of 5x a week for 30 minutes; needs to start these. Cont vit D and calcium.  1/4/19 DEXA was slightly worse in fem neck compared to 10/31/16; -1.3, now -1.4. Lsp was 0.2, now T score 0.0; repeat in 2 years.  Vit D insuff; taking vit D supplements. Level 45.       Review of Systems   Constitutional: Negative for activity change and unexpected weight change.   HENT: Negative for hearing loss, rhinorrhea and trouble swallowing.    Eyes: Negative for discharge and visual disturbance.   Respiratory: Negative for chest tightness and wheezing.    Cardiovascular: Negative for chest pain and palpitations.   Gastrointestinal: Negative for blood in stool, constipation, diarrhea and vomiting.   Endocrine: Negative for polydipsia and polyuria.   Genitourinary: Negative for difficulty urinating, dysuria, hematuria and menstrual problem.   Musculoskeletal: Positive for arthralgias. Negative for joint swelling and neck pain.        Lower back and occ thor back pain. Tylenol arthirits for pain.; or ibuprofen as needed prn. Has had back Xrays [place], [place]. Scoliosis and arthritis present.    Skin: Negative for rash.   Allergic/Immunologic: Negative for environmental allergies and food allergies.   Neurological: Negative for weakness and headaches.   Psychiatric/Behavioral: Negative for confusion and dysphoric mood.       Objective:      Vitals:    01/15/19 1502   BP: 118/80   Pulse: 67   Resp: 17   Temp: 98.1 °F (36.7 °C)   TempSrc: Oral   SpO2: 97%   Weight: 59.1 kg  "(130 lb 4.7 oz)   Height: 5' 4" (1.626 m)     Body mass index is 22.36 kg/m².    Physical Exam   Constitutional: She is oriented to person, place, and time. She appears well-developed and well-nourished.   HENT:   Head: Normocephalic and atraumatic.   Eyes: EOM are normal.   Neck: Normal range of motion. Neck supple. No thyromegaly present.   Cardiovascular: Normal rate, regular rhythm and normal heart sounds. Exam reveals no gallop.   No murmur heard.  Pulmonary/Chest: Effort normal and breath sounds normal. No respiratory distress. She has no wheezes. She has no rales.   Abdominal: Soft. Bowel sounds are normal. She exhibits no distension. There is no tenderness. There is no rebound and no guarding.   Musculoskeletal: Normal range of motion. She exhibits no edema.   Upper Tsp and mid L sp tender to palp.   Lymphadenopathy:     She has no cervical adenopathy.   Neurological: She is alert and oriented to person, place, and time.   Moves all 4 extremities fine.   Skin: No rash noted.   Psychiatric: She has a normal mood and affect. Her behavior is normal. Thought content normal.   Vitals reviewed.      Assessment:       1. Hypothyroidism, unspecified type    2. Mixed hyperlipidemia    3. Pre-hypertension    4. Osteopenia of thigh, unspecified laterality    5. Vitamin D insufficiency    6. Impaired fasting glucose    7. Need for 23-polyvalent pneumococcal polysaccharide vaccine    8. Chronic bilateral low back pain without sciatica        Plan:       Hypothyroidism, unspecified type; same thyroid dosing.  -     T4, free; Future; Expected date: 01/15/2019  -     T3, free; Future; Expected date: 01/15/2019  -     TSH; Future; Expected date: 01/15/2019    Mixed hyperlipidemia  -     Comprehensive metabolic panel; Future; Expected date: 01/15/2019  -     Lipid panel; Future; Expected date: 01/15/2019    Pre-hypertension. Maintain < 2 Gm Na a day diet, and monitor BP at home; keep a log.    Osteopenia of thigh, unspecified " laterality; Weight bearing exercises, continue calcium and vit D supplements. DEXA scabn at 2 yr intervals as needed. DEXA due after 1/4/2021.  -     Vitamin D; Future; Expected date: 01/15/2019  -     Comprehensive metabolic panel; Future; Expected date: 01/15/2019  -     Magnesium; Future; Expected date: 01/15/2019    Vitamin D insufficiency; cont supplements.  -     Vitamin D; Future; Expected date: 01/15/2019  -     Comprehensive metabolic panel; Future; Expected date: 01/15/2019    Impaired fasting glucose. Exercise recommended with weight reduction and low carb diet; we'll follow hemoglobin A1c's with you periodically.  -     Hemoglobin A1c; Future; Expected date: 01/15/2019  -     Comprehensive metabolic panel; Future; Expected date: 01/15/2019    Need for 23-polyvalent pneumococcal polysaccharide vaccine  -     (In Office Administered) Pneumococcal Polysaccharide Vaccine (23 Valent) (SQ/IM)    Chronic bilateral low back pain without sciatica; rarely left sciatica; uses ibuprofen as needed for pain; recommend 400-600 mg 3x a day as needed for pain; Xrays w scoliosis and arthritis; also in Tsp as well w arthritic changes noted xray as well. Has seen Dr Teresa neurosurgery in past

## 2019-03-29 ENCOUNTER — TELEPHONE (OUTPATIENT)
Dept: FAMILY MEDICINE | Facility: CLINIC | Age: 74
End: 2019-03-29

## 2019-03-29 ENCOUNTER — OFFICE VISIT (OUTPATIENT)
Dept: FAMILY MEDICINE | Facility: CLINIC | Age: 74
End: 2019-03-29
Payer: MEDICARE

## 2019-03-29 VITALS
HEIGHT: 64 IN | DIASTOLIC BLOOD PRESSURE: 82 MMHG | WEIGHT: 129.19 LBS | BODY MASS INDEX: 22.06 KG/M2 | OXYGEN SATURATION: 98 % | SYSTOLIC BLOOD PRESSURE: 136 MMHG | TEMPERATURE: 98 F | HEART RATE: 77 BPM

## 2019-03-29 DIAGNOSIS — M62.830 SPASM OF THORACOLUMBAR MUSCLE: Primary | ICD-10-CM

## 2019-03-29 DIAGNOSIS — B02.9 HERPES ZOSTER WITHOUT COMPLICATION: ICD-10-CM

## 2019-03-29 PROCEDURE — 99999 PR PBB SHADOW E&M-EST. PATIENT-LVL IV: CPT | Mod: PBBFAC,,, | Performed by: PHYSICIAN ASSISTANT

## 2019-03-29 PROCEDURE — 99999 PR PBB SHADOW E&M-EST. PATIENT-LVL IV: ICD-10-PCS | Mod: PBBFAC,,, | Performed by: PHYSICIAN ASSISTANT

## 2019-03-29 PROCEDURE — 99213 PR OFFICE/OUTPT VISIT, EST, LEVL III, 20-29 MIN: ICD-10-PCS | Mod: S$PBB,,, | Performed by: PHYSICIAN ASSISTANT

## 2019-03-29 PROCEDURE — 99214 OFFICE O/P EST MOD 30 MIN: CPT | Mod: PBBFAC,PO | Performed by: PHYSICIAN ASSISTANT

## 2019-03-29 PROCEDURE — 99213 OFFICE O/P EST LOW 20 MIN: CPT | Mod: S$PBB,,, | Performed by: PHYSICIAN ASSISTANT

## 2019-03-29 RX ORDER — VALACYCLOVIR HYDROCHLORIDE 1 G/1
1000 TABLET, FILM COATED ORAL 3 TIMES DAILY
Qty: 21 TABLET | Refills: 0 | Status: SHIPPED | OUTPATIENT
Start: 2019-03-29 | End: 2022-03-06

## 2019-03-29 RX ORDER — TIZANIDINE 2 MG/1
4 TABLET ORAL EVERY 8 HOURS
Qty: 30 TABLET | Refills: 0 | Status: SHIPPED | OUTPATIENT
Start: 2019-03-29 | End: 2019-04-08

## 2019-03-29 NOTE — TELEPHONE ENCOUNTER
----- Message from Lanette Rueda sent at 3/29/2019  9:38 AM CDT -----  Contact: pt  Calling in regards to be worked into the schedule for back pain and please advise. [phone]

## 2019-04-01 ENCOUNTER — TELEPHONE (OUTPATIENT)
Dept: FAMILY MEDICINE | Facility: CLINIC | Age: 74
End: 2019-04-01

## 2019-04-01 ENCOUNTER — LAB VISIT (OUTPATIENT)
Dept: LAB | Facility: HOSPITAL | Age: 74
End: 2019-04-01
Attending: INTERNAL MEDICINE
Payer: MEDICARE

## 2019-04-01 DIAGNOSIS — N39.0 URINARY TRACT INFECTION WITHOUT HEMATURIA, SITE UNSPECIFIED: ICD-10-CM

## 2019-04-01 DIAGNOSIS — N39.0 URINARY TRACT INFECTION WITHOUT HEMATURIA, SITE UNSPECIFIED: Primary | ICD-10-CM

## 2019-04-01 LAB
BILIRUB UR QL STRIP: NEGATIVE
CLARITY UR REFRACT.AUTO: CLEAR
COLOR UR AUTO: YELLOW
GLUCOSE UR QL STRIP: NEGATIVE
HGB UR QL STRIP: NEGATIVE
KETONES UR QL STRIP: NEGATIVE
LEUKOCYTE ESTERASE UR QL STRIP: NEGATIVE
NITRITE UR QL STRIP: NEGATIVE
PH UR STRIP: 5 [PH] (ref 5–8)
PROT UR QL STRIP: NEGATIVE
SP GR UR STRIP: 1.02 (ref 1–1.03)
URN SPEC COLLECT METH UR: NORMAL

## 2019-04-01 PROCEDURE — 87086 URINE CULTURE/COLONY COUNT: CPT

## 2019-04-01 PROCEDURE — 81003 URINALYSIS AUTO W/O SCOPE: CPT

## 2019-04-01 NOTE — TELEPHONE ENCOUNTER
Pt has orders for UA and culture. Pt complains of burning, and pain upon urination. Pt has been having s/s since 3/29/2019 and has been taking 's previous script for his UTI. Pt has been taking Cipro and has been feeling better. Advised pt that she should have gone to u/c for testing and treatment. Also advised that since she took an antibiotic w/o a culture to advise which medications will be the best to txt s/s of UTI that this could be doing more harm than good. Pt will go to lab to get test done and will wait for advice on how to proceed. Advised pt not to take any more of her husbands antibiotics with out an order from a physician. Pt states understanding and wants a call back prior to end of day.

## 2019-04-01 NOTE — TELEPHONE ENCOUNTER
----- Message from Suellen Dyer sent at 4/1/2019  1:34 PM CDT -----  Contact: Tiffanie  Type:  RX Refill Request    Who Called:  Katelyn  Refill or New Rx:  New  RX Name and Strength:  n/a  How is the patient currently taking it? (ex. 1XDay):  n/a  Is this a 30 day or 90 day RX:  n/a  Preferred Pharmacy with phone number:    [facility] [address] [address]  Phone: [phone] Fax: [phone]    Local or Mail Order:  local  Ordering Provider:  Rodger  Best Call Back Number:  [phone]  Additional Information:  Pls call pt regarding her testing positive for UTI. She is requesting an rx to be sent to pharm.

## 2019-04-01 NOTE — TELEPHONE ENCOUNTER
Discussed case with patient by phone.  Initial symptoms of UTI with dysuria with frequency which have improved a fair amount with her taking of Cipro 500 mg b.i.d.; after this evening's dose she will completed 3 days; was advised to continue taking the 2 additional days to complete a 5 day course.  Her dysuria has cleared and her frequency is doing a lot better.  For now.  Urine culture is pending and urine analysis is pending as well.  But clinically she is markedly improved.  Advised to call and check basis with us towards the end of the workday tomorrow to see if we have urine culture results at that time.  Advised to push fluids through the antibiotics.  Also [facility] if it is the weekend

## 2019-04-03 LAB — BACTERIA UR CULT: NO GROWTH

## 2019-04-05 ENCOUNTER — TELEPHONE (OUTPATIENT)
Dept: FAMILY MEDICINE | Facility: CLINIC | Age: 74
End: 2019-04-05

## 2019-04-05 NOTE — TELEPHONE ENCOUNTER
----- Message from Dionisio Soares MD sent at 4/5/2019  1:22 AM CDT -----  Please notify patient that her urine culture showed no growth.  Please keep her follow-up appointment for reassessment; push adequate fluids during the day

## 2019-04-24 DIAGNOSIS — E78.2 MIXED HYPERLIPIDEMIA: ICD-10-CM

## 2019-04-24 RX ORDER — PRAVASTATIN SODIUM 40 MG/1
40 TABLET ORAL NIGHTLY
Qty: 90 TABLET | Refills: 0 | Status: SHIPPED | OUTPATIENT
Start: 2019-04-24 | End: 2019-09-24 | Stop reason: SDUPTHER

## 2019-04-24 NOTE — TELEPHONE ENCOUNTER
Pt Of Dionisio Soares MD    Last seen on: 3/29/2019  Next appt: 7/9/2019  Last refill: 9/20/2018    Pharmacy:   [facility] [place], [place] - [address] [place] [address]  Phone: [phone] Fax: [phone]    Please review! Thank you!

## 2019-06-25 ENCOUNTER — PATIENT OUTREACH (OUTPATIENT)
Dept: ADMINISTRATIVE | Facility: HOSPITAL | Age: 74
End: 2019-06-25

## 2019-06-26 ENCOUNTER — PATIENT MESSAGE (OUTPATIENT)
Dept: FAMILY MEDICINE | Facility: CLINIC | Age: 74
End: 2019-06-26

## 2019-06-26 DIAGNOSIS — E03.9 HYPOTHYROIDISM, UNSPECIFIED TYPE: ICD-10-CM

## 2019-06-27 ENCOUNTER — TELEPHONE (OUTPATIENT)
Dept: FAMILY MEDICINE | Facility: CLINIC | Age: 74
End: 2019-06-27

## 2019-06-27 RX ORDER — LEVOTHYROXINE SODIUM 75 UG/1
TABLET ORAL
Qty: 45 TABLET | Refills: 1 | Status: SHIPPED | OUTPATIENT
Start: 2019-06-27 | End: 2019-10-08 | Stop reason: SDUPTHER

## 2019-07-01 ENCOUNTER — LAB VISIT (OUTPATIENT)
Dept: LAB | Facility: HOSPITAL | Age: 74
End: 2019-07-01
Attending: INTERNAL MEDICINE
Payer: MEDICARE

## 2019-07-01 DIAGNOSIS — E55.9 VITAMIN D INSUFFICIENCY: ICD-10-CM

## 2019-07-01 DIAGNOSIS — R73.01 IMPAIRED FASTING GLUCOSE: ICD-10-CM

## 2019-07-01 DIAGNOSIS — E78.2 MIXED HYPERLIPIDEMIA: ICD-10-CM

## 2019-07-01 DIAGNOSIS — M85.859 OSTEOPENIA OF THIGH, UNSPECIFIED LATERALITY: ICD-10-CM

## 2019-07-01 DIAGNOSIS — E03.9 HYPOTHYROIDISM, UNSPECIFIED TYPE: ICD-10-CM

## 2019-07-01 LAB
25(OH)D3+25(OH)D2 SERPL-MCNC: 37 NG/ML (ref 30–96)
ALBUMIN SERPL BCP-MCNC: 4 G/DL (ref 3.5–5.2)
ALP SERPL-CCNC: 74 U/L (ref 55–135)
ALT SERPL W/O P-5'-P-CCNC: 23 U/L (ref 10–44)
ANION GAP SERPL CALC-SCNC: 10 MMOL/L (ref 8–16)
AST SERPL-CCNC: 25 U/L (ref 10–40)
BILIRUB SERPL-MCNC: 0.3 MG/DL (ref 0.1–1)
BUN SERPL-MCNC: 12 MG/DL (ref 8–23)
CALCIUM SERPL-MCNC: 9.7 MG/DL (ref 8.7–10.5)
CHLORIDE SERPL-SCNC: 105 MMOL/L (ref 95–110)
CHOLEST SERPL-MCNC: 213 MG/DL (ref 120–199)
CHOLEST/HDLC SERPL: 3.3 {RATIO} (ref 2–5)
CO2 SERPL-SCNC: 26 MMOL/L (ref 23–29)
CREAT SERPL-MCNC: 0.8 MG/DL (ref 0.5–1.4)
EST. GFR  (AFRICAN AMERICAN): >60 ML/MIN/1.73 M^2
EST. GFR  (NON AFRICAN AMERICAN): >60 ML/MIN/1.73 M^2
ESTIMATED AVG GLUCOSE: 111 MG/DL (ref 68–131)
GLUCOSE SERPL-MCNC: 90 MG/DL (ref 70–110)
HBA1C MFR BLD HPLC: 5.5 % (ref 4–5.6)
HDLC SERPL-MCNC: 65 MG/DL (ref 40–75)
HDLC SERPL: 30.5 % (ref 20–50)
LDLC SERPL CALC-MCNC: 128.4 MG/DL (ref 63–159)
MAGNESIUM SERPL-MCNC: 1.8 MG/DL (ref 1.6–2.6)
NONHDLC SERPL-MCNC: 148 MG/DL
POTASSIUM SERPL-SCNC: 4.3 MMOL/L (ref 3.5–5.1)
PROT SERPL-MCNC: 7.3 G/DL (ref 6–8.4)
SODIUM SERPL-SCNC: 141 MMOL/L (ref 136–145)
T3FREE SERPL-MCNC: 2.1 PG/ML (ref 2.3–4.2)
T4 FREE SERPL-MCNC: 0.78 NG/DL (ref 0.71–1.51)
TRIGL SERPL-MCNC: 98 MG/DL (ref 30–150)
TSH SERPL DL<=0.005 MIU/L-ACNC: 1.78 UIU/ML (ref 0.4–4)

## 2019-07-01 PROCEDURE — 80053 COMPREHEN METABOLIC PANEL: CPT

## 2019-07-01 PROCEDURE — 84443 ASSAY THYROID STIM HORMONE: CPT

## 2019-07-01 PROCEDURE — 80061 LIPID PANEL: CPT

## 2019-07-01 PROCEDURE — 83735 ASSAY OF MAGNESIUM: CPT

## 2019-07-01 PROCEDURE — 84481 FREE ASSAY (FT-3): CPT

## 2019-07-01 PROCEDURE — 84439 ASSAY OF FREE THYROXINE: CPT

## 2019-07-01 PROCEDURE — 83036 HEMOGLOBIN GLYCOSYLATED A1C: CPT

## 2019-07-01 PROCEDURE — 82306 VITAMIN D 25 HYDROXY: CPT

## 2019-07-01 PROCEDURE — 36415 COLL VENOUS BLD VENIPUNCTURE: CPT | Mod: PN

## 2019-07-09 ENCOUNTER — OFFICE VISIT (OUTPATIENT)
Dept: FAMILY MEDICINE | Facility: CLINIC | Age: 74
End: 2019-07-09
Payer: MEDICARE

## 2019-07-09 VITALS
HEIGHT: 64 IN | WEIGHT: 130.06 LBS | HEART RATE: 70 BPM | SYSTOLIC BLOOD PRESSURE: 138 MMHG | BODY MASS INDEX: 22.2 KG/M2 | DIASTOLIC BLOOD PRESSURE: 74 MMHG

## 2019-07-09 DIAGNOSIS — R73.01 IMPAIRED FASTING GLUCOSE: ICD-10-CM

## 2019-07-09 DIAGNOSIS — E78.2 MIXED HYPERLIPIDEMIA: Primary | ICD-10-CM

## 2019-07-09 DIAGNOSIS — E03.9 HYPOTHYROIDISM, UNSPECIFIED TYPE: ICD-10-CM

## 2019-07-09 DIAGNOSIS — E55.9 VITAMIN D INSUFFICIENCY: ICD-10-CM

## 2019-07-09 DIAGNOSIS — R03.0 PRE-HYPERTENSION: ICD-10-CM

## 2019-07-09 DIAGNOSIS — M85.859 OSTEOPENIA OF THIGH, UNSPECIFIED LATERALITY: ICD-10-CM

## 2019-07-09 DIAGNOSIS — D49.3 BREAST TUMOR: ICD-10-CM

## 2019-07-09 DIAGNOSIS — Z90.13 H/O BILATERAL MASTECTOMY: ICD-10-CM

## 2019-07-09 DIAGNOSIS — Z23 NEED FOR SHINGLES VACCINE: ICD-10-CM

## 2019-07-09 PROCEDURE — 99214 OFFICE O/P EST MOD 30 MIN: CPT | Mod: PBBFAC,PN | Performed by: INTERNAL MEDICINE

## 2019-07-09 PROCEDURE — 99999 PR PBB SHADOW E&M-EST. PATIENT-LVL IV: ICD-10-PCS | Mod: PBBFAC,,, | Performed by: INTERNAL MEDICINE

## 2019-07-09 PROCEDURE — 99214 PR OFFICE/OUTPT VISIT, EST, LEVL IV, 30-39 MIN: ICD-10-PCS | Mod: S$PBB,,, | Performed by: INTERNAL MEDICINE

## 2019-07-09 PROCEDURE — 99214 OFFICE O/P EST MOD 30 MIN: CPT | Mod: S$PBB,,, | Performed by: INTERNAL MEDICINE

## 2019-07-09 PROCEDURE — 99999 PR PBB SHADOW E&M-EST. PATIENT-LVL IV: CPT | Mod: PBBFAC,,, | Performed by: INTERNAL MEDICINE

## 2019-07-09 RX ORDER — FLUTICASONE PROPIONATE 50 MCG
1 SPRAY, SUSPENSION (ML) NASAL DAILY
COMMUNITY

## 2019-07-09 NOTE — PROGRESS NOTES
Subjective:       Patient ID: Tiffanie Black is a 73 y.o. female.    Chief Complaint: Follow-up (6 mo f/u. Lab done.)    HPI  Overall has been doing fine here for reassessment and go over her labs.  Mixed hyperlipidemia:  She is on low-fat high-fiber diet; tolerates a pravastatin fine. 10 yr ASCVD risk elevated on pravastatin presently: 13.4% and 39.1% lifetime  Pre hypertension:  Blood pressure manual here 138/74. 120's upper/high 70's at home.   Hypothyroidism:  Takes her levothyroxine appropriately and is on half of the 75 mcg tablet daily.  Impaired fasting glucose:  Knows to watch her carbohydrates.  Good exercise regularly as well. FBS 90; HgA1C 5.4.  Osteopenia with vitamin-D insufficiency:  DEXA scan 01/04/2019 had T-score is lumbar spine 0.0; femoral bone T-score -1.4; slightly worsened; recommend serial weight-bearing exercises; was off her vit D3 for 2 weeks before levels checked; will cont at same vit D3 dosing; has resumed.  Exercises throughout the week with goal of 5 times a week minimum 30 min.; continue vitamin-D supplementation along with her calcium supplementation as well  Shingles vaccine:  Received shingle vaccine in March needs her 2nd series; Shingrix in March 2019.   Sees Dr Wiley Hematology-Oncology for breast cancer f/u's; left breast tumor w patricio mastectomy; chemo prior to her surgery; also received x-ray treatment.     Review of Systems   Constitutional: Negative for activity change and unexpected weight change.   HENT: Negative for hearing loss, rhinorrhea and trouble swallowing.    Eyes: Negative for discharge and visual disturbance.   Respiratory: Negative for chest tightness and wheezing.    Cardiovascular: Negative for chest pain and palpitations.   Gastrointestinal: Negative for blood in stool, constipation, diarrhea and vomiting.   Endocrine: Negative for polydipsia and polyuria.   Genitourinary: Negative for difficulty urinating, dysuria, hematuria and menstrual  "problem.   Musculoskeletal: Negative for arthralgias, joint swelling and neck pain.   Neurological: Negative for weakness and headaches.   Psychiatric/Behavioral: Negative for confusion and dysphoric mood.       Objective:      Vitals:    07/09/19 1259   BP: 138/74   BP Location: Left arm   Pulse: 70   Weight: 59 kg (130 lb 1.1 oz)   Height: 5' 4" (1.626 m)     Body mass index is 22.33 kg/m².    Physical Exam   Constitutional: She is oriented to person, place, and time. She appears well-developed and well-nourished.   HENT:   Head: Normocephalic and atraumatic.   Eyes: EOM are normal.   Neck: Normal range of motion. Neck supple. No thyromegaly present.   Cardiovascular: Normal rate, regular rhythm and normal heart sounds. Exam reveals no gallop.   No murmur heard.  Pulmonary/Chest: Effort normal and breath sounds normal. No respiratory distress. She has no wheezes. She has no rales.   Abdominal: Soft. Bowel sounds are normal. She exhibits no distension. There is no tenderness. There is no rebound and no guarding.   Musculoskeletal: Normal range of motion. She exhibits no edema.   Lymphadenopathy:     She has no cervical adenopathy.   Neurological: She is alert and oriented to person, place, and time.   Moves all 4 extremities fine.   Skin: No rash noted.   Psychiatric: She has a normal mood and affect. Her behavior is normal. Thought content normal.   Vitals reviewed.      Assessment:       1. Mixed hyperlipidemia    2. Pre-hypertension    3. Hypothyroidism, unspecified type    4. Impaired fasting glucose    5. Osteopenia of thigh, unspecified laterality    6. Vitamin D insufficiency    7. Need for shingles vaccine    8. Breast tumor    9. H/O bilateral mastectomy        Plan:       Mixed hyperlipidemia.Maintain low fat high fiber diet, exercise regularly.  -     Comprehensive metabolic panel; Future; Expected date: 07/09/2019  -     Lipid panel; Future; Expected date: 07/09/2019    Pre-hypertension.Maintain < 2 Gm " Na a day diet, and monitor BP at home; keep a log.    Hypothyroidism, unspecified type; same thyroid dosing.   -     Lipid panel; Future; Expected date: 07/09/2019  -     TSH; Future; Expected date: 07/09/2019  -     T4, free; Future; Expected date: 07/09/2019  -     T3, free; Future; Expected date: 07/09/2019    Impaired fasting glucose.Exercise recommended with weight reduction and low carb diet; we'll follow hemoglobin A1c's with you periodically.  -     Comprehensive metabolic panel; Future; Expected date: 07/09/2019  -     Hemoglobin A1c; Future; Expected date: 07/09/2019    Osteopenia of thigh, unspecified laterality.Weight bearing exercises, continue calcium and vit D supplements. DEXA scabn at 2 yr intervals as needed.  DEXA scan needed for follow-up 01/04/2021  -     Vitamin D; Future; Expected date: 07/09/2019    Vitamin D insufficiency; same vit D dosing. Has resumed after being off for 2 weeks as vactioning.   -     Vitamin D; Future; Expected date: 07/09/2019    Need for shingles vaccine; given script to get 2nd Shingrix vaccine.     Breast tumor; hx of patricio mastectomy; took arimadex 5.5 yrs; sees Dr Wiley H/onc for breast cancer follow-ups.  Patient treated with Chemo and XRT as well. Present in [facility]. Chemo before surgery.     H/O bilateral mastectomy

## 2019-07-09 NOTE — PATIENT INSTRUCTIONS
Mixed hyperlipidemia.Maintain low fat high fiber diet, exercise regularly.  -     Comprehensive metabolic panel; Future; Expected date: 07/09/2019  -     Lipid panel; Future; Expected date: 07/09/2019    Pre-hypertension.Maintain < 2 Gm Na a day diet, and monitor BP at home; keep a log.    Hypothyroidism, unspecified type; same thyroid dosing.   -     Lipid panel; Future; Expected date: 07/09/2019  -     TSH; Future; Expected date: 07/09/2019  -     T4, free; Future; Expected date: 07/09/2019  -     T3, free; Future; Expected date: 07/09/2019    Impaired fasting glucose.Exercise recommended with weight reduction and low carb diet; we'll follow hemoglobin A1c's with you periodically.  -     Comprehensive metabolic panel; Future; Expected date: 07/09/2019  -     Hemoglobin A1c; Future; Expected date: 07/09/2019    Osteopenia of thigh, unspecified laterality.Weight bearing exercises, continue calcium and vit D supplements. DEXA scabn at 2 yr intervals as needed.  -     Vitamin D; Future; Expected date: 07/09/2019    Vitamin D insufficiency; same vit D dosing. Has resumed after being off for 2 weeks as vactioning.   -     Vitamin D; Future; Expected date: 07/09/2019    Need for shingles vaccine; given script to get 2nd Shingrix vaccine.     Breast tumor; hx of patricio mastectomy; took arimadex 5.5 yrs; sees Dr Wiley H/onc. Chemo and XRT as well. Present in [facility]. Chemo before surgery.     H/O bilateral mastectomy

## 2019-09-24 DIAGNOSIS — E78.2 MIXED HYPERLIPIDEMIA: ICD-10-CM

## 2019-09-27 RX ORDER — PRAVASTATIN SODIUM 40 MG/1
40 TABLET ORAL NIGHTLY
Qty: 90 TABLET | Refills: 3 | Status: SHIPPED | OUTPATIENT
Start: 2019-09-27 | End: 2020-01-13 | Stop reason: SDUPTHER

## 2019-10-08 DIAGNOSIS — E03.9 HYPOTHYROIDISM, UNSPECIFIED TYPE: ICD-10-CM

## 2019-10-11 RX ORDER — LEVOTHYROXINE SODIUM 75 UG/1
TABLET ORAL
Qty: 45 TABLET | Refills: 1 | Status: SHIPPED | OUTPATIENT
Start: 2019-10-11 | End: 2020-01-13 | Stop reason: SDUPTHER

## 2020-01-13 DIAGNOSIS — E03.9 HYPOTHYROIDISM, UNSPECIFIED TYPE: ICD-10-CM

## 2020-01-13 DIAGNOSIS — E78.2 MIXED HYPERLIPIDEMIA: ICD-10-CM

## 2020-01-13 RX ORDER — PRAVASTATIN SODIUM 40 MG/1
40 TABLET ORAL NIGHTLY
Qty: 90 TABLET | Refills: 1 | Status: SHIPPED | OUTPATIENT
Start: 2020-01-13 | End: 2020-02-05

## 2020-01-13 RX ORDER — LEVOTHYROXINE SODIUM 75 UG/1
75 TABLET ORAL
Qty: 45 TABLET | Refills: 1 | Status: SHIPPED | OUTPATIENT
Start: 2020-01-13 | End: 2020-04-07

## 2020-01-29 ENCOUNTER — LAB VISIT (OUTPATIENT)
Dept: LAB | Facility: HOSPITAL | Age: 75
End: 2020-01-29
Attending: INTERNAL MEDICINE
Payer: MEDICARE

## 2020-01-29 DIAGNOSIS — E78.2 MIXED HYPERLIPIDEMIA: ICD-10-CM

## 2020-01-29 DIAGNOSIS — M85.859 OSTEOPENIA OF THIGH, UNSPECIFIED LATERALITY: ICD-10-CM

## 2020-01-29 DIAGNOSIS — R73.01 IMPAIRED FASTING GLUCOSE: ICD-10-CM

## 2020-01-29 DIAGNOSIS — E55.9 VITAMIN D INSUFFICIENCY: ICD-10-CM

## 2020-01-29 DIAGNOSIS — E03.9 HYPOTHYROIDISM, UNSPECIFIED TYPE: ICD-10-CM

## 2020-01-29 LAB
25(OH)D3+25(OH)D2 SERPL-MCNC: 39 NG/ML (ref 30–96)
ALBUMIN SERPL BCP-MCNC: 4.1 G/DL (ref 3.5–5.2)
ALP SERPL-CCNC: 80 U/L (ref 55–135)
ALT SERPL W/O P-5'-P-CCNC: 24 U/L (ref 10–44)
ANION GAP SERPL CALC-SCNC: 7 MMOL/L (ref 8–16)
AST SERPL-CCNC: 23 U/L (ref 10–40)
BILIRUB SERPL-MCNC: 0.4 MG/DL (ref 0.1–1)
BUN SERPL-MCNC: 19 MG/DL (ref 8–23)
CALCIUM SERPL-MCNC: 10.1 MG/DL (ref 8.7–10.5)
CHLORIDE SERPL-SCNC: 105 MMOL/L (ref 95–110)
CHOLEST SERPL-MCNC: 209 MG/DL (ref 120–199)
CHOLEST/HDLC SERPL: 3 {RATIO} (ref 2–5)
CO2 SERPL-SCNC: 29 MMOL/L (ref 23–29)
CREAT SERPL-MCNC: 0.8 MG/DL (ref 0.5–1.4)
EST. GFR  (AFRICAN AMERICAN): >60 ML/MIN/1.73 M^2
EST. GFR  (NON AFRICAN AMERICAN): >60 ML/MIN/1.73 M^2
GLUCOSE SERPL-MCNC: 95 MG/DL (ref 70–110)
HDLC SERPL-MCNC: 69 MG/DL (ref 40–75)
HDLC SERPL: 33 % (ref 20–50)
LDLC SERPL CALC-MCNC: 123.8 MG/DL (ref 63–159)
NONHDLC SERPL-MCNC: 140 MG/DL
POTASSIUM SERPL-SCNC: 4.5 MMOL/L (ref 3.5–5.1)
PROT SERPL-MCNC: 7.6 G/DL (ref 6–8.4)
SODIUM SERPL-SCNC: 141 MMOL/L (ref 136–145)
T3FREE SERPL-MCNC: 2.6 PG/ML (ref 2.3–4.2)
T4 FREE SERPL-MCNC: 0.91 NG/DL (ref 0.71–1.51)
TRIGL SERPL-MCNC: 81 MG/DL (ref 30–150)
TSH SERPL DL<=0.005 MIU/L-ACNC: 1.56 UIU/ML (ref 0.4–4)

## 2020-01-29 PROCEDURE — 36415 COLL VENOUS BLD VENIPUNCTURE: CPT | Mod: PN

## 2020-01-29 PROCEDURE — 83036 HEMOGLOBIN GLYCOSYLATED A1C: CPT

## 2020-01-29 PROCEDURE — 80053 COMPREHEN METABOLIC PANEL: CPT

## 2020-01-29 PROCEDURE — 84443 ASSAY THYROID STIM HORMONE: CPT

## 2020-01-29 PROCEDURE — 82306 VITAMIN D 25 HYDROXY: CPT

## 2020-01-29 PROCEDURE — 84439 ASSAY OF FREE THYROXINE: CPT

## 2020-01-29 PROCEDURE — 84481 FREE ASSAY (FT-3): CPT

## 2020-01-29 PROCEDURE — 80061 LIPID PANEL: CPT

## 2020-01-30 LAB
ESTIMATED AVG GLUCOSE: 111 MG/DL (ref 68–131)
HBA1C MFR BLD HPLC: 5.5 % (ref 4–5.6)

## 2020-02-05 ENCOUNTER — OFFICE VISIT (OUTPATIENT)
Dept: FAMILY MEDICINE | Facility: CLINIC | Age: 75
End: 2020-02-05
Payer: MEDICARE

## 2020-02-05 ENCOUNTER — LAB VISIT (OUTPATIENT)
Dept: LAB | Facility: HOSPITAL | Age: 75
End: 2020-02-05
Attending: INTERNAL MEDICINE
Payer: MEDICARE

## 2020-02-05 VITALS
TEMPERATURE: 99 F | HEART RATE: 71 BPM | BODY MASS INDEX: 22.28 KG/M2 | OXYGEN SATURATION: 97 % | DIASTOLIC BLOOD PRESSURE: 80 MMHG | HEIGHT: 64 IN | SYSTOLIC BLOOD PRESSURE: 128 MMHG | WEIGHT: 130.5 LBS

## 2020-02-05 DIAGNOSIS — R10.32 BILATERAL LOWER ABDOMINAL PAIN: ICD-10-CM

## 2020-02-05 DIAGNOSIS — R10.11 RUQ ABDOMINAL PAIN: ICD-10-CM

## 2020-02-05 DIAGNOSIS — J30.2 SEASONAL ALLERGIES: ICD-10-CM

## 2020-02-05 DIAGNOSIS — M85.859 OSTEOPENIA OF NECK OF FEMUR, UNSPECIFIED LATERALITY: ICD-10-CM

## 2020-02-05 DIAGNOSIS — Z78.9 ALCOHOL USE: ICD-10-CM

## 2020-02-05 DIAGNOSIS — E78.2 MIXED HYPERLIPIDEMIA: Primary | ICD-10-CM

## 2020-02-05 DIAGNOSIS — E55.9 VITAMIN D INSUFFICIENCY: ICD-10-CM

## 2020-02-05 DIAGNOSIS — R10.31 BILATERAL LOWER ABDOMINAL PAIN: ICD-10-CM

## 2020-02-05 DIAGNOSIS — R19.7 DIARRHEA, UNSPECIFIED TYPE: ICD-10-CM

## 2020-02-05 DIAGNOSIS — R73.01 IMPAIRED FASTING GLUCOSE: ICD-10-CM

## 2020-02-05 DIAGNOSIS — K92.2 LGI BLEED: ICD-10-CM

## 2020-02-05 DIAGNOSIS — K64.9 HEMORRHOIDS, UNSPECIFIED HEMORRHOID TYPE: ICD-10-CM

## 2020-02-05 DIAGNOSIS — E53.8 VITAMIN B12 DEFICIENCY: ICD-10-CM

## 2020-02-05 DIAGNOSIS — E03.9 HYPOTHYROIDISM, UNSPECIFIED TYPE: ICD-10-CM

## 2020-02-05 DIAGNOSIS — R03.0 PRE-HYPERTENSION: ICD-10-CM

## 2020-02-05 LAB
BASOPHILS # BLD AUTO: 0.03 K/UL (ref 0–0.2)
BASOPHILS NFR BLD: 0.6 % (ref 0–1.9)
DIFFERENTIAL METHOD: ABNORMAL
EOSINOPHIL # BLD AUTO: 0.1 K/UL (ref 0–0.5)
EOSINOPHIL NFR BLD: 2.3 % (ref 0–8)
ERYTHROCYTE [DISTWIDTH] IN BLOOD BY AUTOMATED COUNT: 12.9 % (ref 11.5–14.5)
HCT VFR BLD AUTO: 44.7 % (ref 37–48.5)
HGB BLD-MCNC: 13.9 G/DL (ref 12–16)
IMM GRANULOCYTES # BLD AUTO: 0.01 K/UL (ref 0–0.04)
IMM GRANULOCYTES NFR BLD AUTO: 0.2 % (ref 0–0.5)
LYMPHOCYTES # BLD AUTO: 1.5 K/UL (ref 1–4.8)
LYMPHOCYTES NFR BLD: 29.4 % (ref 18–48)
MCH RBC QN AUTO: 29.9 PG (ref 27–31)
MCHC RBC AUTO-ENTMCNC: 31.1 G/DL (ref 32–36)
MCV RBC AUTO: 96 FL (ref 82–98)
MONOCYTES # BLD AUTO: 0.6 K/UL (ref 0.3–1)
MONOCYTES NFR BLD: 11.9 % (ref 4–15)
NEUTROPHILS # BLD AUTO: 2.9 K/UL (ref 1.8–7.7)
NEUTROPHILS NFR BLD: 55.6 % (ref 38–73)
NRBC BLD-RTO: 0 /100 WBC
PLATELET # BLD AUTO: 208 K/UL (ref 150–350)
PMV BLD AUTO: 11.1 FL (ref 9.2–12.9)
RBC # BLD AUTO: 4.65 M/UL (ref 4–5.4)
WBC # BLD AUTO: 5.21 K/UL (ref 3.9–12.7)

## 2020-02-05 PROCEDURE — 99215 OFFICE O/P EST HI 40 MIN: CPT | Mod: S$PBB,,, | Performed by: INTERNAL MEDICINE

## 2020-02-05 PROCEDURE — 99999 PR PBB SHADOW E&M-EST. PATIENT-LVL IV: ICD-10-PCS | Mod: PBBFAC,,, | Performed by: INTERNAL MEDICINE

## 2020-02-05 PROCEDURE — 36415 COLL VENOUS BLD VENIPUNCTURE: CPT | Mod: PN

## 2020-02-05 PROCEDURE — 82150 ASSAY OF AMYLASE: CPT

## 2020-02-05 PROCEDURE — 99215 PR OFFICE/OUTPT VISIT, EST, LEVL V, 40-54 MIN: ICD-10-PCS | Mod: S$PBB,,, | Performed by: INTERNAL MEDICINE

## 2020-02-05 PROCEDURE — 99214 OFFICE O/P EST MOD 30 MIN: CPT | Mod: PBBFAC,PN | Performed by: INTERNAL MEDICINE

## 2020-02-05 PROCEDURE — 99999 PR PBB SHADOW E&M-EST. PATIENT-LVL IV: CPT | Mod: PBBFAC,,, | Performed by: INTERNAL MEDICINE

## 2020-02-05 PROCEDURE — 85025 COMPLETE CBC W/AUTO DIFF WBC: CPT

## 2020-02-05 PROCEDURE — 83690 ASSAY OF LIPASE: CPT

## 2020-02-05 RX ORDER — PANTOPRAZOLE SODIUM 40 MG/1
40 TABLET, DELAYED RELEASE ORAL DAILY
Qty: 90 TABLET | Refills: 0 | Status: SHIPPED | OUTPATIENT
Start: 2020-02-05 | End: 2022-07-05

## 2020-02-05 RX ORDER — HYDROCORTISONE 25 MG/G
CREAM TOPICAL
Qty: 28 G | Refills: 1 | Status: SHIPPED | OUTPATIENT
Start: 2020-02-05 | End: 2021-06-28 | Stop reason: SDUPTHER

## 2020-02-05 RX ORDER — HYOSCYAMINE SULFATE 0.125 MG
125 TABLET ORAL EVERY 6 HOURS PRN
Qty: 30 TABLET | Refills: 1 | Status: SHIPPED | OUTPATIENT
Start: 2020-02-05 | End: 2020-05-26

## 2020-02-05 RX ORDER — PRAVASTATIN SODIUM 80 MG/1
80 TABLET ORAL NIGHTLY
Qty: 90 TABLET | Refills: 1 | Status: SHIPPED | OUTPATIENT
Start: 2020-02-05 | End: 2020-05-01

## 2020-02-05 NOTE — PATIENT INSTRUCTIONS
Mixed hyperlipidemia.Maintain low fat high fiber diet, exercise regularly. Weight reduction where indicated. Increase pravastatin to 80 mg each night.   -     Lipid panel; Future; Expected date: 02/05/2020  -     pravastatin (PRAVACHOL) 80 MG tablet; Take 1 tablet (80 mg total) by mouth every evening.  Dispense: 90 tablet; Refill: 1  -     Comprehensive metabolic panel; Future; Expected date: 02/05/2020    Impaired fasting glucose .Exercise recommended with weight reduction and low carb diet; we'll follow hemoglobin A1c's with you periodically.  -     Hemoglobin A1c; Future; Expected date: 02/05/2020  -     Cancel: Glucose, fasting; Future; Expected date: 02/19/2020    Hypothyroidism, unspecified type; same levothyroxine dosing of 1/2 of 75 mcg a day.     Osteopenia of neck of femur, unspecified laterality.Weight bearing exercises, continue calcium and vit D supplements. DEXA scabn at 2 yr intervals as needed. DEXA after 1/4/2021.     Vitamin D insufficiency; improved; cont vit D3 at 2000 iu a day.    Seasonal allergies; clartin 10 mg a day for congestion; also uses flonase 1-2 sprays a day.  -     CBC auto differential; Future; Expected date: 02/05/2020    Vitamin B12 deficiency; B12 at 500 mcg a day; Women's MVI one a day.     Pre-hypertension; Maintain < 2 Gm Na a day diet, and monitor BP at home; keep a log.    LGI bleed: hx of diverticulosis, and hemorrhoids. Stopped krill oil in August and helped.   -     CT Abdomen Pelvis With Contrast; Future; Expected date: 02/05/2020  -     pantoprazole (PROTONIX) 40 MG tablet; Take 1 tablet (40 mg total) by mouth once daily.  Dispense: 90 tablet; Refill: 0  -     Ambulatory referral/consult to Gastroenterology; Future; Expected date: 02/12/2020, Dr Scott.     Hemorrhoids, unspecified hemorrhoid type  -     Ambulatory referral/consult to Gastroenterology; Future; Expected date: 02/12/2020    Bilateral lower abdominal pain:   Pt w hx of RUQ abd pain on/off for 2 yrs;  recently w bilateral LQ abd pain on/off for 3-4 mos now; before BM's and relieved after BM's. Also w recently covered tissue paper w blood. A lot of gas discomfort as well. Please evaluate. Alcohol stopped. Advised against any NSAID use. Hx diverticulosis. No fever or chills. Placed on levsin and protonix. CT abd/pelvis ordered. Last TC 7/29/15 by you; diverticulosis rest nl; 5 yr repeat. Please evaluate and treat. Pt wants to wait on any antibiotics for diverticulitis coverage. ;   -     CT Abdomen Pelvis With Contrast; Future; Expected date: 02/05/2020  -     hyoscyamine (ANASPAZ,LEVSIN) 0.125 mg Tab; Take 1 tablet (125 mcg total) by mouth every 6 (six) hours as needed (abdominal cramping.).  Dispense: 30 tablet; Refill: 1  -     Ambulatory referral/consult to Gastroenterology; Future; Expected date: 02/12/2020; Dr Scott.     RUQ abdominal pain  -     CT Abdomen Pelvis With Contrast; Future; Expected date: 02/05/2020  -     pantoprazole (PROTONIX) 40 MG tablet; Take 1 tablet (40 mg total) by mouth once daily.  Dispense: 90 tablet; Refill: 0  -     Ambulatory referral/consult to Gastroenterology; Future; Expected date: 02/12/2020; Dr Scott.     Diarrhea, unspecified type; limit any caffeine. Needs to cut back from 3 cups of coffee a day; imodium otc for diarrhea. Levsin for abd cramping.   -     Amylase; Future; Expected date: 02/05/2020  -     Lipase; Future; Expected date: 02/05/2020  -     CT Abdomen Pelvis With Contrast; Future; Expected date: 02/05/2020  -     Ambulatory referral/consult to Gastroenterology; Future; Expected date: 02/12/2020    Alcohol use; needs to wean off all alcohol.   -     pantoprazole (PROTONIX) 40 MG tablet; Take 1 tablet (40 mg total) by mouth once daily.  Dispense: 90 tablet; Refill: 0  -     Ambulatory referral/consult to Gastroenterology; Future; Expected date: 02/12/2020

## 2020-02-05 NOTE — PROGRESS NOTES
Subjective:       Patient ID: Tiffanie Black is a 74 y.o. female.    Chief Complaint: Follow-up (review lab results)    HPI  Patient here today for reassessment and go over her labs. PMH and surgical hx delineated and noted; SMH/FMH also delineated and noted. ROS obtained at length prior to physical exam being performed.     Mixed hyperlipidemia:  On low-fat high-fiber diet of least tries; tolerates a pravastatin fine.  Recent lipid panel with total cholesterol 209, triglyceride 81, HDL 69, LDL slightly improved further to 124., liver function test within normal limits. Increase pravastatin to 80 mg q hs.      Impaired fasting glucose:  Watches a carbohydrates or at least tries; exercises with walking daily for 25-30 minutes.  Recent fasting blood sugar 95; recent hemoglobin A1c normal at 5.5.     Hypothyroidism:  Takes her levothyroxine appropriately; takes a 1/2 of a 75 mcg tablet daily.  Recent TSH normal at 1.55 with free T3 increased to normal now at 2.6 and free T4 normal at 0.91.     Osteopenia:  Walks almost daily 25-30 minutes; takes a vitamin D3 supplementation over-the-counter.  01/04/2019 DEXA scan:  Lumbar spine T-score 0.0; femoral bone mineral density -1.4, T-score; results compare to 10/31/16 DEXA scan.  Would recommend repeating DEXA scan in January of 2021     Vitamin-D insufficiency:  Recent vitamin-D level 25 hydroxy returned back 39; up from 37.  Continue vitamin-D 3 supplementation .     Pre hypertension:  Watches her salt intake; blood pressure here is borderline at 128/80 in pre hypertension range.         LGI bleed w hx of hemorrhoid; recently on and off notes blood on tissue paper for last 3 mos; no abd pain exc RUQ pain intermitenty for 2 yrs. No heartburn or belching. Use of NSAID 1x a month. No prior hx of GB ds. Alcohol 2 glasses of wine a day. Advised of need to stop all alcohol; and not to use even occasional NSAID agents; use tylenol XS for pain instead. Also w intermittent  LLQ/RLQ abd pain as well for 3-4 mos on/off; suspects from gas. BM's relieve the pain after about three BM's which have been soft and about 3 a day..  GI consult will be placed to Dr. Scott; pt will be placed on 40 mg of Protonix to be taken daily and Levsin to take as needed for abdominal cramping.  Recent liver function tests have been normal but will check amylase and lipase to evaluate for possible pancreatitis with regular alcohol intake abdominal pain and diarrhea.  07/29/2015 total colonoscopy by Dr. Scott: diverticulosis in sigmoid descending colon; otherwise normal.  Five year repeat total colonoscopy recommended at that time.  .   Total time: 120-255 pm; > 50% time spent on discussion, counseling, and review. Plan of care discussed w pt at length. .PMH and surgical hx delineated and noted; SMH/FMH also delineated and noted. ROS obtained at length prior to physical exam being performed.  Various different diagnosis is discussed at length with patient as well as plan of care.  Extensive time spent evaluating and discussing workup for her lower bilateral abdominal pain with lower GI bleed as well as with her right upper quadrant abdominal pain workup as well.  Labs ordered for follow-up including CT scan of the abdomen and pelvis with contrast and GI consult to Dr. Scott.  Medications reviewed, addressed, and prescribed.    Review of Systems   Constitutional: Negative for activity change and unexpected weight change.   HENT: Positive for dental problem. Negative for hearing loss, rhinorrhea and trouble swallowing.    Eyes: Negative for discharge and visual disturbance.   Respiratory: Negative for chest tightness and wheezing.    Cardiovascular: Negative for chest pain and palpitations.   Gastrointestinal: Positive for blood in stool. Negative for constipation, diarrhea and vomiting.        Occ loose BM's 2-3x a week. With wiping has noted blood on tissue paper for 2-3 mos on/off; using prepH and medicated  "wipes. Notes hemrrhoidal bulge at times.    Endocrine: Negative for polydipsia and polyuria.   Genitourinary: Negative for difficulty urinating, dysuria, hematuria and menstrual problem.   Musculoskeletal: Positive for arthralgias and joint swelling. Negative for neck pain.   Skin: Negative for rash.   Allergic/Immunologic: Negative for environmental allergies and food allergies.   Neurological: Negative for syncope, weakness and headaches.   Psychiatric/Behavioral: Negative for confusion and dysphoric mood.       Objective:      Vitals:    02/05/20 1312   BP: 128/80   BP Location: Right arm   Patient Position: Sitting   BP Method: Medium (Manual)   Pulse: 71   Temp: 98.7 °F (37.1 °C)   TempSrc: Oral   SpO2: 97%   Weight: 59.2 kg (130 lb 8.2 oz)   Height: 5' 4" (1.626 m)     Body mass index is 22.4 kg/m².  Wt Readings from Last 3 Encounters:   02/05/20 59.2 kg (130 lb 8.2 oz)   07/09/19 59 kg (130 lb 1.1 oz)   06/19/19 58.1 kg (128 lb 2 oz)        Physical Exam   Constitutional: She is oriented to person, place, and time. She appears well-developed and well-nourished.   HENT:   Head: Normocephalic and atraumatic.   Eyes: EOM are normal.   Neck: Normal range of motion. Neck supple. No thyromegaly present.   Cardiovascular: Normal rate, regular rhythm and normal heart sounds. Exam reveals no gallop.   No murmur heard.  Pulmonary/Chest: Effort normal and breath sounds normal. No respiratory distress. She has no wheezes. She has no rales.   Abdominal: Soft. Bowel sounds are normal. She exhibits no distension. There is no tenderness. There is no rebound and no guarding.   Musculoskeletal: Normal range of motion. She exhibits no edema.   Lymphadenopathy:     She has no cervical adenopathy.   Neurological: She is alert and oriented to person, place, and time.   Moves all 4 extremities fine.   Skin: No rash noted.   Psychiatric: She has a normal mood and affect. Her behavior is normal. Thought content normal.   Vitals " reviewed.      Assessment:       1. Mixed hyperlipidemia    2. Impaired fasting glucose    3. Hypothyroidism, unspecified type    4. Osteopenia of neck of femur, unspecified laterality    5. Vitamin D insufficiency    6. Seasonal allergies    7. Vitamin B12 deficiency    8. Pre-hypertension    9. LGI bleed    10. Hemorrhoids, unspecified hemorrhoid type    11. Bilateral lower abdominal pain    12. RUQ abdominal pain    13. Diarrhea, unspecified type    14. Alcohol use        Plan:       Mixed hyperlipidemia.Maintain low fat high fiber diet, exercise regularly. Weight reduction where indicated. Increase pravastatin to 80 mg each night.   -     Lipid panel; Future; Expected date: 02/05/2020  -     pravastatin (PRAVACHOL) 80 MG tablet; Take 1 tablet (80 mg total) by mouth every evening.  Dispense: 90 tablet; Refill: 1  -     Comprehensive metabolic panel; Future; Expected date: 02/05/2020    Impaired fasting glucose .Exercise recommended with weight reduction and low carb diet; we'll follow hemoglobin A1c's with you periodically.  -     Hemoglobin A1c; Future; Expected date: 02/05/2020  -     Cancel: Glucose, fasting; Future; Expected date: 02/19/2020    Hypothyroidism, unspecified type; same levothyroxine dosing of 1/2 of 75 mcg a day.     Osteopenia of neck of femur, unspecified laterality.Weight bearing exercises, continue calcium and vit D supplements. DEXA scabn at 2 yr intervals as needed. DEXA after 1/4/2021.     Vitamin D insufficiency; improved; cont vit D3 at 2000 iu a day.    Seasonal allergies; clartin 10 mg a day for congestion; also uses flonase 1-2 sprays a day.  -     CBC auto differential; Future; Expected date: 02/05/2020    Vitamin B12 deficiency; B12 at 500 mcg a day; Women's MVI one a day.     Pre-hypertension; Maintain < 2 Gm Na a day diet, and monitor BP at home; keep a log.    LGI bleed: hx of diverticulosis, and hemorrhoids. Stopped krill oil in August and helped.  Needs EGD and CT of the  abdomen and pelvis if CT scan of the abdomen and pelvis is clear  -     CT Abdomen Pelvis With Contrast; Future; Expected date: 02/05/2020  -     pantoprazole (PROTONIX) 40 MG tablet; Take 1 tablet (40 mg total) by mouth once daily.  Dispense: 90 tablet; Refill: 0  -     Ambulatory referral/consult to Gastroenterology; Future; Expected date: 02/12/2020, Dr Scott.     Hemorrhoids, unspecified hemorrhoid type; prescribed Anusol HC rectal cream 2.5% to be used 2 to 3 times a day as needed for hemorrhoids; remain off Krill oil.   -     Ambulatory referral/consult to Gastroenterology; Future; Expected date: 02/12/2020; Dr. Scott.    Bilateral lower abdominal pain:   Pt w hx of RUQ abd pain on/off for 2 yrs; recently w bilateral LQ abd pain on/off for 3-4 mos now; before BM's and relieved after BM's. Also w recently covered tissue paper w blood. A lot of gas discomfort as well.  Alcohol stopped. Advised against any NSAID use. Hx diverticulosis. No fever or chills. Placed on levsin p.r.n. abdominal cramping and protonix 40 mg daily. CT abd/pelvis ordered. Last TC 7/29/15 by Dr. Scott: diverticulosis rest nl; 5 yr repeat.  Pt wants to wait on any antibiotics for diverticulitis coverage. Needs EGD and TC if CT abd/pelvis free of diverticulitis; Dr Scott GI consulted.  -     CT Abdomen Pelvis With Contrast; Future; Expected date: 02/05/2020  -     hyoscyamine (ANASPAZ,LEVSIN) 0.125 mg Tab; Take 1 tablet (125 mcg total) by mouth every 6 (six) hours as needed (abdominal cramping.).  Dispense: 30 tablet; Refill: 1  -     Ambulatory referral/consult to Gastroenterology; Future; Expected date: 02/12/2020; Dr Scott for further evaluation and treatment..  Also for EGD and total colonoscopy if no evidence of diverticulitis on CT scan abdomen and pelvis    RUQ abdominal pain; have extra to quit alcohol altogether.  Will place on Protonix 40 mg daily and obtain a CT scan abdomen pelvis with contrast for further evaluation; GI  consult placed to  as feel patient also needs an EGD for this and a total colonoscopy if CT the abdomen and pelvis is clear  -     CT Abdomen Pelvis With Contrast; Future; Expected date: 02/05/2020  -     pantoprazole (PROTONIX) 40 MG tablet; Take 1 tablet (40 mg total) by mouth once daily.  Dispense: 90 tablet; Refill: 0  -     Ambulatory referral/consult to Gastroenterology; Future; Expected date: 02/12/2020; Dr Scott.     Diarrhea, unspecified type; limit any caffeine. Needs to cut back from 3 cups of coffee a day; imodium otc for diarrhea. Levsin for abd cramping.  Improved with stopping Krill oil in the fall; CMP recently with normal liver function test and renal function; amylase and lipase levels to be checked with CBC; need to also rule out mild pancreatitis with regular alcohol use; to wean off her alcohol altogether and limit any caffeine intake  -     Amylase; Future; Expected date: 02/05/2020  -     Lipase; Future; Expected date: 02/05/2020  -     CT Abdomen Pelvis With Contrast; Future; Expected date: 02/05/2020  -     Ambulatory referral/consult to Gastroenterology; Future; Expected date: 02/12/2020    Alcohol use; needs to wean off all alcohol altogether.  May be contributing towards an alcoholic gastritis and scant lower GI bleed; rule out peptic ulcer disease with lower GI bleed; patient to go to the emergency room should she worsen; placed on Protonix 40 mg p.o. daily generic.  -     pantoprazole (PROTONIX) 40 MG tablet; Take 1 tablet (40 mg total) by mouth once daily.  Dispense: 90 tablet; Refill: 0  -     Ambulatory referral/consult to Gastroenterology; Future; Expected date: 02/12/2020; Dr. Scott.

## 2020-02-06 ENCOUNTER — HOSPITAL ENCOUNTER (OUTPATIENT)
Dept: RADIOLOGY | Facility: HOSPITAL | Age: 75
Discharge: HOME | End: 2020-02-06
Attending: INTERNAL MEDICINE
Payer: MEDICARE

## 2020-02-06 DIAGNOSIS — K92.2 LGI BLEED: ICD-10-CM

## 2020-02-06 DIAGNOSIS — R10.11 RUQ ABDOMINAL PAIN: ICD-10-CM

## 2020-02-06 DIAGNOSIS — R10.32 BILATERAL LOWER ABDOMINAL PAIN: ICD-10-CM

## 2020-02-06 DIAGNOSIS — R10.31 BILATERAL LOWER ABDOMINAL PAIN: ICD-10-CM

## 2020-02-06 DIAGNOSIS — R19.7 DIARRHEA, UNSPECIFIED TYPE: ICD-10-CM

## 2020-02-06 LAB
AMYLASE SERPL-CCNC: 43 U/L (ref 20–110)
LIPASE SERPL-CCNC: 33 U/L (ref 4–60)

## 2020-02-06 PROCEDURE — 25500020 PHARM REV CODE 255: Mod: PO | Performed by: INTERNAL MEDICINE

## 2020-02-06 PROCEDURE — 74177 CT ABDOMEN PELVIS WITH CONTRAST: ICD-10-PCS | Mod: 26,,, | Performed by: RADIOLOGY

## 2020-02-06 PROCEDURE — 74177 CT ABD & PELVIS W/CONTRAST: CPT | Mod: TC,PO

## 2020-02-06 PROCEDURE — 74177 CT ABD & PELVIS W/CONTRAST: CPT | Mod: 26,,, | Performed by: RADIOLOGY

## 2020-02-06 RX ADMIN — IOHEXOL 1000 ML: 9 SOLUTION ORAL at 08:02

## 2020-02-06 RX ADMIN — IOHEXOL 75 ML: 350 INJECTION, SOLUTION INTRAVENOUS at 08:02

## 2020-03-23 ENCOUNTER — TELEPHONE (OUTPATIENT)
Dept: FAMILY MEDICINE | Facility: CLINIC | Age: 75
End: 2020-03-23

## 2020-03-23 NOTE — TELEPHONE ENCOUNTER
Spoke with patient in regards to canceling ov, pt says eye surgery was rescheduled and doesn't know when it will happen, no since in keeping appt. Patient will phone when ready to rechedule.

## 2020-04-07 DIAGNOSIS — E03.9 HYPOTHYROIDISM, UNSPECIFIED TYPE: ICD-10-CM

## 2020-04-07 RX ORDER — LEVOTHYROXINE SODIUM 75 UG/1
37.5 TABLET ORAL
Qty: 45 TABLET | Refills: 1 | Status: SHIPPED | OUTPATIENT
Start: 2020-04-07 | End: 2020-07-10 | Stop reason: SDUPTHER

## 2020-04-23 ENCOUNTER — TELEPHONE (OUTPATIENT)
Dept: FAMILY MEDICINE | Facility: CLINIC | Age: 75
End: 2020-04-23

## 2020-04-23 DIAGNOSIS — E55.9 VITAMIN D INSUFFICIENCY: ICD-10-CM

## 2020-04-23 DIAGNOSIS — M85.859 OSTEOPENIA OF NECK OF FEMUR, UNSPECIFIED LATERALITY: ICD-10-CM

## 2020-04-23 DIAGNOSIS — R73.01 IMPAIRED FASTING GLUCOSE: ICD-10-CM

## 2020-04-23 DIAGNOSIS — E03.9 HYPOTHYROIDISM, UNSPECIFIED TYPE: ICD-10-CM

## 2020-04-23 DIAGNOSIS — E53.8 VITAMIN B12 DEFICIENCY: ICD-10-CM

## 2020-04-23 DIAGNOSIS — E78.2 MIXED HYPERLIPIDEMIA: Primary | ICD-10-CM

## 2020-04-23 NOTE — TELEPHONE ENCOUNTER
Pt has appt scheduled 07/07/2020 for 6 mth f/u, would like an order placed to have lab work done prior to visit.       Please review and advise. Thank you.

## 2020-04-24 ENCOUNTER — PATIENT MESSAGE (OUTPATIENT)
Dept: FAMILY MEDICINE | Facility: CLINIC | Age: 75
End: 2020-04-24

## 2020-04-24 NOTE — TELEPHONE ENCOUNTER
Please notify patient that her chart was reviewed and labs were ordered for her to obtain 1 week before her follow-up appointment.  Please obtain these labs the next morning after an overnight 8 hr fast.

## 2020-04-29 DIAGNOSIS — E78.2 MIXED HYPERLIPIDEMIA: ICD-10-CM

## 2020-04-30 ENCOUNTER — PATIENT MESSAGE (OUTPATIENT)
Dept: FAMILY MEDICINE | Facility: CLINIC | Age: 75
End: 2020-04-30

## 2020-04-30 RX ORDER — PRAVASTATIN SODIUM 40 MG/1
40 TABLET ORAL NIGHTLY
Qty: 90 TABLET | Refills: 1 | Status: SHIPPED | OUTPATIENT
Start: 2020-04-30 | End: 2020-05-01

## 2020-05-01 ENCOUNTER — OFFICE VISIT (OUTPATIENT)
Dept: FAMILY MEDICINE | Facility: CLINIC | Age: 75
End: 2020-05-01
Payer: MEDICARE

## 2020-05-01 DIAGNOSIS — E03.9 HYPOTHYROIDISM, UNSPECIFIED TYPE: ICD-10-CM

## 2020-05-01 DIAGNOSIS — K92.2 LGI BLEED: ICD-10-CM

## 2020-05-01 DIAGNOSIS — R03.0 PRE-HYPERTENSION: ICD-10-CM

## 2020-05-01 DIAGNOSIS — R73.01 IMPAIRED FASTING BLOOD SUGAR: ICD-10-CM

## 2020-05-01 DIAGNOSIS — Z01.818 PREOPERATIVE EVALUATION TO RULE OUT SURGICAL CONTRAINDICATION: Primary | ICD-10-CM

## 2020-05-01 DIAGNOSIS — H25.9 AGE-RELATED CATARACT OF BOTH EYES, UNSPECIFIED AGE-RELATED CATARACT TYPE: ICD-10-CM

## 2020-05-01 DIAGNOSIS — R93.5 ABNORMAL CT OF THE ABDOMEN: ICD-10-CM

## 2020-05-01 DIAGNOSIS — E73.9 LACTOSE INTOLERANCE: ICD-10-CM

## 2020-05-01 DIAGNOSIS — E78.2 MIXED HYPERLIPIDEMIA: ICD-10-CM

## 2020-05-01 DIAGNOSIS — K21.9 GASTROESOPHAGEAL REFLUX DISEASE, ESOPHAGITIS PRESENCE NOT SPECIFIED: ICD-10-CM

## 2020-05-01 DIAGNOSIS — R10.30 LOWER ABDOMINAL PAIN: ICD-10-CM

## 2020-05-01 PROCEDURE — 99215 PR OFFICE/OUTPT VISIT, EST, LEVL V, 40-54 MIN: ICD-10-PCS | Mod: 95,,, | Performed by: INTERNAL MEDICINE

## 2020-05-01 PROCEDURE — 99215 OFFICE O/P EST HI 40 MIN: CPT | Mod: 95,,, | Performed by: INTERNAL MEDICINE

## 2020-05-01 RX ORDER — PRAVASTATIN SODIUM 80 MG/1
80 TABLET ORAL NIGHTLY
Qty: 90 TABLET | Refills: 1 | Status: SHIPPED | OUTPATIENT
Start: 2020-05-01 | End: 2021-04-01

## 2020-05-01 NOTE — Clinical Note
Please have patient schedule a 3 month follow-up with me.  Labs to be teen 1 week prior after an overnight fast; needs to obtain COVID screen before her elective procedure at [facility] or [facility]

## 2020-05-01 NOTE — PROGRESS NOTES
Subjective:      The patient location is: home  The chief complaint leading to consultation is: preop evaluation  Visit type: audiovisual  Total time spent with patient:  953 through 10:35 a.m..  Greater than 50% of time spent in discussion, counseling, and review.  Additional 20 min spent in supplemental documentation.  Labs ordered for routine follow-up as well.  Medications reviewed.  Each patient to whom he or she provides medical services by telemedicine is:  (1) informed of the relationship between the physician and patient and the respective role of any other health care provider with respect to management of the patient; and (2) notified that he or she may decline to receive medical services by telemedicine and may withdraw from such care at any time.    Notes:  Please see HPI below.       Patient ID: Tiffanie Black is a 74 y.o. female.    Chief Complaint:  Here for preop evaluation for cataract surgery.    HPI  Here today for preop evaluation for right eye cataract surgery/repair. Surgery scheduled for 5/19/2020 w Dr MAX Hubbard. Plans are to do left eye as well for cataract surgery/repair at later date shortly thereafter.  Patient does use bifocal glasses.  She has no complaints of chest pain, shortness of breath or heart palpitations; no known history of any chronic cardiac or pulmonary disease.  No known history of any bleeding problems.  No known problems with any prior surgeries.  She has been advised not to take any fish oil, vitamin-D, ASA or NSAID agents 10 days prior to surgery.  She is to use Tylenol for any pain over-the-counter.       Mixed hyperlipidemia:  On low-fat high-fiber diet; tolerates pravastatin fine; last visit a pravastatin was increased from 40 to 80 mg each evening.     Pre- hypertension:  Knows to watch her salt intake; check her blood pressure periodically as well.  /80 at 4 weeks ago no pulse was taken.     Hypothyroidism:  On half of 75 mcg levothyroxine tablet  daily.  Takes her levothyroxine appropriately.  01/29/2020 TSH 1.556.  Free T4 normal at 0.91; free T3 normal at 2.6.     Mixed hyperlipidemia:  On low-fat high-fiber diet; tolerates pravastatin fine.  Will increase to 80 mg a day to try and improve her numbers.  .8.  LDL goal less than 100.       Lower GI bleed/with last office visit 02/05/2020; patient with history of diverticulosis and hemorrhoids.  Stopped Krill oil sometime earlier and helped; placed on pantoprazole 40 mg per day at that time; patient also with bilateral lower quadrant and right upper quadrant abdominal pain with diarrhea and alcohol use.  She was referred to GI physician Dr. Scott; stopped her lactose intake and diarrhea markedly improved; stopping her Krill oil which helped as well; pt opted not to take any antibiotics for possible diverticulitis.  Had a CT of the abdomen pelvis done which showed no acute abnormality to explain her pain.  But did note short-segment asymmetrical bowel wall thickening the mid sigmoid without surrounding inflammatory change.  Please see final results for CT abdomen and pelvis for additional delineation.  Finding also were suggestive of having hepatic steatosis were noted.  Total colonoscopy was recommended to exclude mass.  Total colonoscopy is still pending at this time.  Patient has laid off lactose products and has found a marked improvement in her abdominal discomfort and diarrhea.  Now uses lactose pills as well.  Has also been recommended to quit alcohol use as well.  Agree with changing her pantoprazole to 40 mg per day as needed for GI upset, heartburn or belching.       PMH and surgical hx delineated and noted; SMH/FMH also delineated and noted. ROS obtained at length prior to physical exam being performed.    Past Medical History:   Diagnosis Date    Cancer 2008    left breast    Chemotherapy induced neutropenia     Hypothyroidism     Impaired fasting glucose     Mixed hyperlipidemia      Osteopenia     Pre-hypertension 4/27/2017    Seasonal allergies     Vitamin B12 deficiency     Vitamin D insufficiency        Past Surgical History:   Procedure Laterality Date    Bilateral mastectomy      BLADDER SUSPENSION  08/2012    Done same time as her Hysterectomy w BSO; Dr Smith her Gyn.     COLONOSCOPY  07/29/2015    Dr Scott; divert ds descending/sigmoid ; 5 yr f/u;.2020 due.    HYSTERECTOMY  08/2012    Robotic; w BSO; Dr Smith Gyn did her surgery. Bladder suspension done at same time.     JOINT REPLACEMENT Right 08/2015    Right hip total replaced. Dr Jaleesa kothari did her surgery       Social History     Tobacco Use    Smoking status: Former Smoker     Types: Cigarettes    Smokeless tobacco: Never Used   Substance Use Topics    Alcohol use: Yes     Alcohol/week: 2.0 - 3.0 standard drinks     Types: 2 - 3 Glasses of wine per week     Frequency: 4 or more times a week     Drinks per session: 3 or 4     Binge frequency: Never     Comment: nightly    Drug use: No       Current Outpatient Medications on File Prior to Visit   Medication Sig Dispense Refill    azelastine (ASTELIN) 137 mcg (0.1 %) nasal spray 1 SPRAY IN EACH NOSTRIL or 2 SPRAYS IN EACH NOSTRIL 2 TIMES DAILY  0    calcium citrate (CALCITRATE) 200 mg (950 mg) tablet Take 1,200 mg by mouth once daily.       cholecalciferol, vitamin D3, 2,000 unit Tab Take 2,000 mg by mouth once daily.      clobetasol (OLUX) 0.05 % Foam APPLY TO AFFECTED AREA on scalp DAILY AS NEEDED  11    cyanocobalamin 500 MCG tablet Take 500 mcg by mouth once daily.      fluticasone propionate (FLONASE) 50 mcg/actuation nasal spray 1 spray by Each Nare route once daily.      hydrocortisone (ANUSOL-HC) 2.5 % rectal cream Apply rectally 2-3x a day as needed for hemorrhoids. 28 g 1    hyoscyamine (ANASPAZ,LEVSIN) 0.125 mg Tab Take 1 tablet (125 mcg total) by mouth every 6 (six) hours as needed (abdominal cramping.). 30 tablet 1    ketoconazole (NIZORAL) 2 %  shampoo apply to scalp leave on for 5 minutes then rinse 3times a week  11    levothyroxine (SYNTHROID) 75 MCG tablet Take 0.5 tablets (37.5 mcg total) by mouth before breakfast. 45 tablet 1    pantoprazole (PROTONIX) 40 MG tablet Take 1 tablet (40 mg total) by mouth once daily. 90 tablet 0    UBIDECARENONE (COENZYME Q10) 100 mg Tab Take 100 mg by mouth once daily.       valACYclovir (VALTREX) 1000 MG tablet Take 1 tablet (1,000 mg total) by mouth 3 (three) times daily. for 7 days 21 tablet 0    zolpidem (AMBIEN) 10 mg Tab Take 1 tablet (10 mg total) by mouth nightly as needed. 30 tablet 3     No current facility-administered medications on file prior to visit.        Review of Systems   Constitutional: Negative for appetite change, chills, fever and unexpected weight change.   HENT: Negative for congestion, postnasal drip, rhinorrhea and sore throat.    Respiratory: Negative for cough, chest tightness, shortness of breath and wheezing.    Cardiovascular: Negative for chest pain, palpitations and leg swelling.   Gastrointestinal: Negative for abdominal pain, blood in stool, constipation, diarrhea, nausea and vomiting.   Genitourinary: Negative for dysuria and hematuria.   Musculoskeletal: Negative for arthralgias and myalgias.   Skin: Negative for rash.   Allergic/Immunologic: Negative for environmental allergies and food allergies.   Neurological: Negative for tremors, syncope and weakness.   Hematological: Does not bruise/bleed easily.   Psychiatric/Behavioral: Negative for dysphoric mood and sleep disturbance. The patient is not nervous/anxious.        Objective:       Vital signs:  130/82 @ 4 weeks ago; with pulse 79; today estimated respiratory rate 12 to 14.  Weight 126 lb yesterday.  Alert and responsive answers questions appropriately.    There were no vitals filed for this visit.  There is no height or weight on file to calculate BMI.  Wt Readings from Last 3 Encounters:   02/05/20 59.2 kg (130 lb 8.2  oz)   07/09/19 59 kg (130 lb 1.1 oz)   06/19/19 58.1 kg (128 lb 2 oz)        Physical Exam   Constitutional: She is oriented to person, place, and time. She appears well-developed and well-nourished. No distress.   Alert and responsive in no apparent distress.  Answers questions appropriately with good thought content.   HENT:   Head: Normocephalic and atraumatic.   Eyes: EOM are normal.   Neck: Normal range of motion.   Pulmonary/Chest: Effort normal. No respiratory distress.   Breathing comfortably; no signs of any respiratory distress or audible wheezing heard.   Neurological: She is alert and oriented to person, place, and time.   Psychiatric: She has a normal mood and affect. Her behavior is normal. Judgment and thought content normal.       Assessment:       1. Preoperative evaluation to rule out surgical contraindication    2. Age-related cataract of both eyes, unspecified age-related cataract type    3. Mixed hyperlipidemia    4. Hypothyroidism, unspecified type    5. Gastroesophageal reflux disease, esophagitis presence not specified    6. Lactose intolerance    7. Lower abdominal pain    8. LGI bleed    9. Abnormal CT of the abdomen    10. Pre-hypertension    11. Impaired fasting blood sugar        Plan:       Preoperative evaluation to rule out surgical contraindication:  Low risk procedure with low risk patient; no contraindications to anesthesia.  Ophthalmologist is Dr. Kaur. To avoid aspirin, NSAID's, fish oil, or any vitamin- E, 10 days before surgery; can use Tylenol for pain.  Need for COVID-19 screen prior to her elective procedure to be determined by Dr. Kaur.  Plan is for right cataract surgical repair 05/19/2020 with plan to do left eye afterwards as well.  Patient with bifocals of note.    Age-related cataract of both eyes, unspecified age-related cataract type    Mixed hyperlipidemia: Maintain low fat high fiber diet, exercise regularly. Weight reduction where indicated.  Increase  pravastatin to 80 mg per HS to help improve her numbers.  -     pravastatin (PRAVACHOL) 80 MG tablet; Take 1 tablet (80 mg total) by mouth every evening.  Dispense: 90 tablet; Refill: 1    Hypothyroidism, unspecified type:  01/29/2020 TSH 1.556; continue levothyroxine half of a 75 mcg tablet p.o. daily; free T4 0.91 and free T3 2.6    Gastroesophageal reflux disease, esophagitis presence not specified: No bedtime snacks; weight reduction.  Change pantoprazole to 40 mg per day as needed for reflux or GI upset.    Lactose intolerance:  Restriction of lactose along with use of lactose tablets his markedly improved her abdominal discomfort and her diarrhea.    Lower abdominal pain:  Along with right upper quadrant pain, have all improved with restriction of lactose in use of lactose pill supplements; also with discontinuation of alcohol and use of pantoprazole; avoid any alcohol intake or NSAID use.    LGI bleed:  Markedly improved with above; essentially has cleared    Abnormal CT of the abdomen:  No acute abnormality on CT the abdomen pelvis to explain patient's pain.  Noted on CT scan was a short segment of asymmetrical bowel wall thickening the mid sigmoid without surrounding inflammatory change which could be related to non-distension or peristalsis; total colonoscopy was recommended to exclude mass :  At present total colonoscopy still pending; will need COVID-19 screen 48 hr before colonoscopy.    Pre-hypertension: Maintain < 2 Gm Na a day diet, and monitor BP at home; keep a log.    Impaired fasting blood sugar: Exercise recommended with weight reduction and low carb diet; we'll follow hemoglobin A1c's with you periodically.

## 2020-05-03 NOTE — PATIENT INSTRUCTIONS
Preoperative evaluation to rule out surgical contraindication:  Low risk procedure with low risk patient; no contraindications to anesthesia.  Ophthalmologist is Dr. Kaur. To avoid aspirin, NSAID's, fish oil, or any vitamin- E, 10 days before surgery; can use Tylenol for pain.  Needs COVID-19 screen prior to her elective procedure.  Plan is for right cataract surgical repair 05/19/2020 with plan to do left eye afterwards as well.  Patient with by focus of note.  -     COVID-19 Routine Screening; Future; Expected date: 05/01/2020    Age-related cataract of both eyes, unspecified age-related cataract type    Mixed hyperlipidemia: Maintain low fat high fiber diet, exercise regularly. Weight reduction where indicated.  Increase pravastatin to 80 mg per HS to help improve her numbers.  -     pravastatin (PRAVACHOL) 80 MG tablet; Take 1 tablet (80 mg total) by mouth every evening.  Dispense: 90 tablet; Refill: 1    Hypothyroidism, unspecified type:  01/29/2020 TSH 1.556; continue levothyroxine half of a 75 mcg tablet p.o. daily; free T4 0.91 and free T3 2.6    Gastroesophageal reflux disease, esophagitis presence not specified: No bedtime snacks; weight reduction.  Change pantoprazole to 40 mg per day as needed for reflux or GI upset.    Lactose intolerance:  Restriction of lactose along with use of lactose tablets his markedly improved her abdominal discomfort and her diarrhea.    Lower abdominal pain:  Along with right upper quadrant pain, have all improved with restriction of lactose in use of lactose pill supplements; also with discontinuation of alcohol and use of pantoprazole    LGI bleed:  Markedly improved with above    Abnormal CT of the abdomen:  No acute abnormality on CT the abdomen pelvis to explain patient's pain.  Noted on CT scan was a short segment of asymmetrical bowel wall thickening the mid sigmoid without surrounding inflammatory change which could be related to non-distension or peristalsis; total  colonoscopy was recommended to exclude mass :  At present total colonoscopy still pending    Pre-hypertension: Maintain < 2 Gm Na a day diet, and monitor BP at home; keep a log.    Impaired fasting blood sugar: Exercise recommended with weight reduction and low carb diet; we'll follow hemoglobin A1c's with you periodically.

## 2020-05-04 ENCOUNTER — TELEPHONE (OUTPATIENT)
Dept: FAMILY MEDICINE | Facility: CLINIC | Age: 75
End: 2020-05-04

## 2020-05-04 NOTE — TELEPHONE ENCOUNTER
----- Message from Dionisio Soares MD sent at 5/3/2020  5:50 PM CDT -----  Please have patient schedule a 3 month follow-up with me.  Labs to be teen 1 week prior after an overnight fast; needs to obtain COVID screen before her elective procedure at [facility] or [facility]

## 2020-05-26 DIAGNOSIS — R10.31 BILATERAL LOWER ABDOMINAL PAIN: ICD-10-CM

## 2020-05-26 DIAGNOSIS — R10.32 BILATERAL LOWER ABDOMINAL PAIN: ICD-10-CM

## 2020-05-26 RX ORDER — HYOSCYAMINE SULFATE 0.125 MG
125 TABLET ORAL EVERY 6 HOURS PRN
Qty: 30 TABLET | Refills: 1 | Status: SHIPPED | OUTPATIENT
Start: 2020-05-26 | End: 2022-03-06

## 2020-06-30 ENCOUNTER — LAB VISIT (OUTPATIENT)
Dept: LAB | Facility: HOSPITAL | Age: 75
End: 2020-06-30
Attending: INTERNAL MEDICINE
Payer: MEDICARE

## 2020-06-30 DIAGNOSIS — E78.2 MIXED HYPERLIPIDEMIA: ICD-10-CM

## 2020-06-30 DIAGNOSIS — E53.8 VITAMIN B12 DEFICIENCY: ICD-10-CM

## 2020-06-30 DIAGNOSIS — E03.9 HYPOTHYROIDISM, UNSPECIFIED TYPE: ICD-10-CM

## 2020-06-30 DIAGNOSIS — M85.859 OSTEOPENIA OF NECK OF FEMUR, UNSPECIFIED LATERALITY: ICD-10-CM

## 2020-06-30 DIAGNOSIS — R73.01 IMPAIRED FASTING GLUCOSE: ICD-10-CM

## 2020-06-30 DIAGNOSIS — E55.9 VITAMIN D INSUFFICIENCY: ICD-10-CM

## 2020-06-30 LAB
25(OH)D3+25(OH)D2 SERPL-MCNC: 44 NG/ML (ref 30–96)
ALBUMIN SERPL BCP-MCNC: 4.2 G/DL (ref 3.5–5.2)
ALP SERPL-CCNC: 83 U/L (ref 55–135)
ALT SERPL W/O P-5'-P-CCNC: 18 U/L (ref 10–44)
ANION GAP SERPL CALC-SCNC: 10 MMOL/L (ref 8–16)
AST SERPL-CCNC: 22 U/L (ref 10–40)
BILIRUB SERPL-MCNC: 0.3 MG/DL (ref 0.1–1)
BUN SERPL-MCNC: 10 MG/DL (ref 8–23)
CALCIUM SERPL-MCNC: 9.1 MG/DL (ref 8.7–10.5)
CHLORIDE SERPL-SCNC: 104 MMOL/L (ref 95–110)
CHOLEST SERPL-MCNC: 179 MG/DL (ref 120–199)
CHOLEST/HDLC SERPL: 2.8 {RATIO} (ref 2–5)
CO2 SERPL-SCNC: 25 MMOL/L (ref 23–29)
CREAT SERPL-MCNC: 0.8 MG/DL (ref 0.5–1.4)
EST. GFR  (AFRICAN AMERICAN): >60 ML/MIN/1.73 M^2
EST. GFR  (NON AFRICAN AMERICAN): >60 ML/MIN/1.73 M^2
GLUCOSE SERPL-MCNC: 94 MG/DL (ref 70–110)
HDLC SERPL-MCNC: 63 MG/DL (ref 40–75)
HDLC SERPL: 35.2 % (ref 20–50)
LDLC SERPL CALC-MCNC: 98.2 MG/DL (ref 63–159)
NONHDLC SERPL-MCNC: 116 MG/DL
POTASSIUM SERPL-SCNC: 3.8 MMOL/L (ref 3.5–5.1)
PROT SERPL-MCNC: 7.5 G/DL (ref 6–8.4)
SODIUM SERPL-SCNC: 139 MMOL/L (ref 136–145)
T3FREE SERPL-MCNC: 2.8 PG/ML (ref 2.3–4.2)
T4 FREE SERPL-MCNC: 0.98 NG/DL (ref 0.71–1.51)
TRIGL SERPL-MCNC: 89 MG/DL (ref 30–150)
TSH SERPL DL<=0.005 MIU/L-ACNC: 1.29 UIU/ML (ref 0.4–4)
VIT B12 SERPL-MCNC: 614 PG/ML (ref 210–950)

## 2020-06-30 PROCEDURE — 80053 COMPREHEN METABOLIC PANEL: CPT

## 2020-06-30 PROCEDURE — 84443 ASSAY THYROID STIM HORMONE: CPT

## 2020-06-30 PROCEDURE — 82607 VITAMIN B-12: CPT

## 2020-06-30 PROCEDURE — 36415 COLL VENOUS BLD VENIPUNCTURE: CPT | Mod: PN

## 2020-06-30 PROCEDURE — 84481 FREE ASSAY (FT-3): CPT

## 2020-06-30 PROCEDURE — 80061 LIPID PANEL: CPT

## 2020-06-30 PROCEDURE — 82306 VITAMIN D 25 HYDROXY: CPT

## 2020-06-30 PROCEDURE — 84439 ASSAY OF FREE THYROXINE: CPT

## 2020-06-30 PROCEDURE — 83036 HEMOGLOBIN GLYCOSYLATED A1C: CPT

## 2020-07-01 LAB
ESTIMATED AVG GLUCOSE: 105 MG/DL (ref 68–131)
HBA1C MFR BLD HPLC: 5.3 % (ref 4–5.6)

## 2020-07-07 ENCOUNTER — OFFICE VISIT (OUTPATIENT)
Dept: FAMILY MEDICINE | Facility: CLINIC | Age: 75
End: 2020-07-07
Payer: MEDICARE

## 2020-07-07 VITALS
BODY MASS INDEX: 21.27 KG/M2 | SYSTOLIC BLOOD PRESSURE: 150 MMHG | HEART RATE: 76 BPM | DIASTOLIC BLOOD PRESSURE: 85 MMHG | HEIGHT: 64 IN | OXYGEN SATURATION: 98 % | WEIGHT: 124.56 LBS | TEMPERATURE: 98 F

## 2020-07-07 DIAGNOSIS — I10 ESSENTIAL HYPERTENSION: ICD-10-CM

## 2020-07-07 DIAGNOSIS — R73.01 IMPAIRED FASTING GLUCOSE: ICD-10-CM

## 2020-07-07 DIAGNOSIS — M85.859 OSTEOPENIA OF THIGH, UNSPECIFIED LATERALITY: ICD-10-CM

## 2020-07-07 DIAGNOSIS — E78.2 MIXED HYPERLIPIDEMIA: Primary | ICD-10-CM

## 2020-07-07 DIAGNOSIS — E55.9 VITAMIN D INSUFFICIENCY: ICD-10-CM

## 2020-07-07 DIAGNOSIS — Z78.9 ALCOHOL USE: ICD-10-CM

## 2020-07-07 DIAGNOSIS — E03.9 HYPOTHYROIDISM, UNSPECIFIED TYPE: ICD-10-CM

## 2020-07-07 DIAGNOSIS — D72.819 LEUKOPENIA, UNSPECIFIED TYPE: ICD-10-CM

## 2020-07-07 PROCEDURE — 99214 PR OFFICE/OUTPT VISIT, EST, LEVL IV, 30-39 MIN: ICD-10-PCS | Mod: S$PBB,,, | Performed by: INTERNAL MEDICINE

## 2020-07-07 PROCEDURE — 99214 OFFICE O/P EST MOD 30 MIN: CPT | Mod: S$PBB,,, | Performed by: INTERNAL MEDICINE

## 2020-07-07 PROCEDURE — 99213 OFFICE O/P EST LOW 20 MIN: CPT | Mod: PBBFAC,PN | Performed by: INTERNAL MEDICINE

## 2020-07-07 PROCEDURE — 99999 PR PBB SHADOW E&M-EST. PATIENT-LVL III: ICD-10-PCS | Mod: PBBFAC,,, | Performed by: INTERNAL MEDICINE

## 2020-07-07 PROCEDURE — 99999 PR PBB SHADOW E&M-EST. PATIENT-LVL III: CPT | Mod: PBBFAC,,, | Performed by: INTERNAL MEDICINE

## 2020-07-07 NOTE — PATIENT INSTRUCTIONS
Mixed hyperlipidemia: Maintain low fat high fiber diet, exercise regularly. Weight reduction where indicated. Cont pravastatin; add DASH diet.   -     Comprehensive metabolic panel; Future; Expected date: 07/07/2020  -     Lipid Panel; Future; Expected date: 07/07/2020  -     T4, free; Future; Expected date: 07/07/2020  -     T3, free; Future; Expected date: 07/07/2020  -     TSH; Future; Expected date: 07/07/2020    Essential hypertension: Maintain < 2 Gm Na a day diet, and monitor BP at home; keep a log. Weight bearing exercises 5x a week; Add DASH diet.   -     Comprehensive metabolic panel; Future; Expected date: 07/07/2020  -     Lipid Panel; Future; Expected date: 07/07/2020  -     T4, free; Future; Expected date: 07/07/2020  -     T3, free; Future; Expected date: 07/07/2020  -     TSH; Future; Expected date: 07/07/2020    Hypothyroidism, unspecified type: same thyroid dosing.   -     T4, free; Future; Expected date: 07/07/2020  -     T3, free; Future; Expected date: 07/07/2020  -     TSH; Future; Expected date: 07/07/2020    Impaired fasting glucose: Exercise recommended with weight reduction and low carb diet; we'll follow hemoglobin A1c's with you periodically.  -     Comprehensive metabolic panel; Future; Expected date: 07/07/2020  -     Hemoglobin A1C; Future; Expected date: 07/07/2020    Osteopenia of thigh, unspecified laterality: Weight bearing exercises, continue calcium and vit D supplements. DEXA scabn at 2 yr intervals as needed. DEXA due after 1/4/2021. Limit alcohol. Presently at 14 glasses of wine a week; needs to decrease to <5 per week if not completely off. Not smoker. No FMH of osteoporosis known.   -     Comprehensive metabolic panel; Future; Expected date: 07/07/2020  -     T4, free; Future; Expected date: 07/07/2020  -     T3, free; Future; Expected date: 07/07/2020  -     TSH; Future; Expected date: 07/07/2020  -     Magnesium; Future; Expected date: 07/07/2020  -     Vitamin D; Future;  Expected date: 07/07/2020    Vitamin D insufficiency: same vit D3 supplements at 3000 iu a day.   -     Vitamin D; Future; Expected date: 07/07/2020    Alcohol use: wean off alcohol; or to at least less then 5 per week.    Leukopenia: CBC w f/u; wean off alcohol. Avoid NSAID agents; use tylenol for pain.

## 2020-07-07 NOTE — PROGRESS NOTES
Subjective:       Patient ID: Tiffanie Black is a 74 y.o. female.    Chief Complaint: Follow-up (review of labs)    HPI  patient here today for reassessment and go over labs.     HTN new dx: BP today 138/88 w repeat 150/85 by me; prior BP's in offices: 136/79, 128/80, 138/74, and 124/81. Pt at home w old BP cuff needing to be replaced; mid/upper 120's/78-82. Technique on home BP discussed. Watch salt intake at <2gm a day and add DASH diet.      Mix HLP; on low fat high fiber diet; tolerates pravastatin fine.  06/30/2020 lipid profile:   /triglyceride 89/HDL 63/LDL 98.2; 10 year risk of a cardiac event moderate risk at 16.9%    Hypothyroid: takes levothyroxine 1/2 of 75 mcg a day. TSH 1.28; free T3 and free T4 wnl; same thyroid dosing.       GERD: No bedtime snacks; weight reduction. Recently started taking pantoprazole daily. Trial of prn pantoprazole as needed to be done.      Colon cancer screen: 6/8/2020 TC Dr Scott: neg for polyps; redundant colon w diverticulosis sigmoid and descending colon. Hx of prior polyps; 5 yr f/u. Previous TC without polyps.      Osteopenia: Last DEXA 1/4/19: Lsp T score 1.7; fem bone -1.4; down from -1.3  10/31/16; DEXA in 2 yrs; Weight bearing exercises, continue calcium and vit D supplements. DEXA scan at 2 yr intervals as needed. Vit D level of 44; up from 39.      IFBS: watches her carbs or tries; HgA1C 5.3. FBS 94.  Regular exercise will also help .    The 10-year ASCVD risk score (Jay CAROLEE Jr., et al., 2013) is: 16.9%    Values used to calculate the score:      Age: 74 years      Sex: Female      Is Non- : No      Diabetic: No      Tobacco smoker: No      Systolic Blood Pressure: 150 mmHg      Is BP treated: No      HDL Cholesterol: 63 mg/dL      Total Cholesterol: 179 mg/dL      Review of Systems   Constitutional: Negative for fever and unexpected weight change.   HENT: Negative for congestion, postnasal drip and rhinorrhea.    Respiratory:  "Negative for cough, chest tightness, shortness of breath and wheezing.    Cardiovascular: Negative for chest pain, palpitations and leg swelling.   Gastrointestinal: Negative for abdominal pain, blood in stool, constipation, diarrhea, nausea and vomiting.   Genitourinary: Negative for dysuria and hematuria.   Musculoskeletal: Negative for arthralgias and myalgias.        Nl arthritis c/o's.    Neurological: Negative for tremors and weakness.   Psychiatric/Behavioral: Negative for dysphoric mood. The patient is not nervous/anxious.        Objective:      Vitals:    07/07/20 1310 07/07/20 1331   BP: 138/88 (!) 150/85   BP Location: Right arm    Patient Position: Sitting    BP Method: Medium (Manual)    Pulse: 76    Temp: 98.4 °F (36.9 °C)    TempSrc: Oral    SpO2: 98%    Weight: 56.5 kg (124 lb 9 oz)    Height: 5' 4" (1.626 m)      Body mass index is 21.38 kg/m².  Wt Readings from Last 3 Encounters:   07/07/20 56.5 kg (124 lb 9 oz)   06/17/20 56.2 kg (124 lb)   02/05/20 59.2 kg (130 lb 8.2 oz)        Physical Exam  Vitals signs reviewed.   Constitutional:       Appearance: She is well-developed.   HENT:      Head: Normocephalic and atraumatic.   Eyes:      Extraocular Movements: Extraocular movements intact.   Neck:      Musculoskeletal: Normal range of motion and neck supple.      Thyroid: No thyromegaly.      Vascular: No carotid bruit.   Cardiovascular:      Rate and Rhythm: Normal rate and regular rhythm.      Heart sounds: Normal heart sounds. No murmur. No gallop.    Pulmonary:      Effort: Pulmonary effort is normal. No respiratory distress.      Breath sounds: Normal breath sounds. No wheezing or rales.   Abdominal:      General: Bowel sounds are normal. There is no distension.      Palpations: Abdomen is soft.      Tenderness: There is no abdominal tenderness. There is no guarding or rebound.   Musculoskeletal: Normal range of motion.   Lymphadenopathy:      Cervical: No cervical adenopathy.   Skin:     " Findings: No rash.   Neurological:      Mental Status: She is alert and oriented to person, place, and time.      Comments: Moves all 4 extremities fine.   Psychiatric:         Behavior: Behavior normal.         Thought Content: Thought content normal.         Assessment:       1. Mixed hyperlipidemia    2. Essential hypertension    3. Hypothyroidism, unspecified type    4. Impaired fasting glucose    5. Osteopenia of thigh, unspecified laterality    6. Vitamin D insufficiency    7. Leukopenia, unspecified type    8. Alcohol use        Plan:       Mixed hyperlipidemia: Maintain low fat high fiber diet, exercise regularly. Weight reduction where indicated. Cont pravastatin; add DASH diet.   -     Comprehensive metabolic panel; Future; Expected date: 07/07/2020  -     Lipid Panel; Future; Expected date: 07/07/2020  -     T4, free; Future; Expected date: 07/07/2020  -     T3, free; Future; Expected date: 07/07/2020  -     TSH; Future; Expected date: 07/07/2020    Essential hypertension:  New diagnosis: Maintain < 2 Gm Na a day diet, and monitor BP at home; keep a log. Weight bearing exercises 5x a week; Add DASH diet.   -     Comprehensive metabolic panel; Future; Expected date: 07/07/2020  -     Lipid Panel; Future; Expected date: 07/07/2020  -     T4, free; Future; Expected date: 07/07/2020  -     T3, free; Future; Expected date: 07/07/2020  -     TSH; Future; Expected date: 07/07/2020    Hypothyroidism, unspecified type: same thyroid dosing.   -     T4, free; Future; Expected date: 07/07/2020  -     T3, free; Future; Expected date: 07/07/2020  -     TSH; Future; Expected date: 07/07/2020    Impaired fasting glucose: Exercise recommended with weight reduction and low carb diet; we'll follow hemoglobin A1c's with you periodically.  -     Comprehensive metabolic panel; Future; Expected date: 07/07/2020  -     Hemoglobin A1C; Future; Expected date: 07/07/2020    Osteopenia of thigh, unspecified laterality: Weight  bearing exercises, continue calcium and vit D supplements. DEXA scabn at 2 yr intervals as needed. DEXA due after 1/4/2021. Limit alcohol. Presently at 14 glasses of wine a week; needs to decrease to <5 per week if not completely off. Not smoker. No FMH of osteoporosis known.   -     Comprehensive metabolic panel; Future; Expected date: 07/07/2020  -     T4, free; Future; Expected date: 07/07/2020  -     T3, free; Future; Expected date: 07/07/2020  -     TSH; Future; Expected date: 07/07/2020  -     Magnesium; Future; Expected date: 07/07/2020  -     Vitamin D; Future; Expected date: 07/07/2020    Vitamin D insufficiency: same vit D3 supplements at 3000 iu a day.   -     Vitamin D; Future; Expected date: 07/07/2020    Alcohol use: wean off alcohol; or to at least less then 5 per week.    Leukopenia: CBC w f/u; wean off alcohol. Avoid NSAID agents; use tylenol for pain.

## 2020-07-10 DIAGNOSIS — E03.9 HYPOTHYROIDISM, UNSPECIFIED TYPE: ICD-10-CM

## 2020-07-10 NOTE — TELEPHONE ENCOUNTER
Last ov- 07/07/2020  Future ov- N/A  Last refill - 04/07/2020      Please review and advise. Thank you

## 2020-07-12 RX ORDER — LEVOTHYROXINE SODIUM 75 UG/1
37.5 TABLET ORAL
Qty: 45 TABLET | Refills: 1 | Status: SHIPPED | OUTPATIENT
Start: 2020-07-12 | End: 2020-10-24

## 2020-07-18 ENCOUNTER — TELEPHONE (OUTPATIENT)
Dept: FAMILY MEDICINE | Facility: CLINIC | Age: 75
End: 2020-07-18

## 2020-07-21 ENCOUNTER — PATIENT OUTREACH (OUTPATIENT)
Dept: ADMINISTRATIVE | Facility: HOSPITAL | Age: 75
End: 2020-07-21

## 2020-07-29 ENCOUNTER — TELEPHONE (OUTPATIENT)
Dept: FAMILY MEDICINE | Facility: CLINIC | Age: 75
End: 2020-07-29

## 2020-07-29 DIAGNOSIS — E78.2 MIXED HYPERLIPIDEMIA: ICD-10-CM

## 2020-07-29 NOTE — TELEPHONE ENCOUNTER
----- Message from Kamini Victor sent at 7/29/2020  3:28 PM CDT -----  Type: Needs Medical Advice  Who Called:  Patient   Best Call Back Number: [phone]  Additional Information: Per patient was told to schedule a nurse visit for blood pressure check within 2 weeks-please advise-thank you

## 2020-07-30 ENCOUNTER — CLINICAL SUPPORT (OUTPATIENT)
Dept: FAMILY MEDICINE | Facility: CLINIC | Age: 75
End: 2020-07-30
Payer: MEDICARE

## 2020-07-30 VITALS — DIASTOLIC BLOOD PRESSURE: 74 MMHG | TEMPERATURE: 99 F | SYSTOLIC BLOOD PRESSURE: 118 MMHG | HEART RATE: 66 BPM

## 2020-07-30 DIAGNOSIS — Z01.30 ENCOUNTER FOR BLOOD PRESSURE EXAMINATION: Primary | ICD-10-CM

## 2020-07-30 PROCEDURE — 99499 UNLISTED E&M SERVICE: CPT | Mod: S$PBB,,, | Performed by: INTERNAL MEDICINE

## 2020-07-30 PROCEDURE — 99499 NO LOS: ICD-10-PCS | Mod: S$PBB,,, | Performed by: INTERNAL MEDICINE

## 2020-07-30 PROCEDURE — 99999 PR PBB SHADOW E&M-EST. PATIENT-LVL II: CPT | Mod: PBBFAC,,,

## 2020-07-30 PROCEDURE — 99999 PR PBB SHADOW E&M-EST. PATIENT-LVL II: ICD-10-PCS | Mod: PBBFAC,,,

## 2020-07-30 PROCEDURE — 99212 OFFICE O/P EST SF 10 MIN: CPT | Mod: PBBFAC,PN

## 2020-07-30 NOTE — PROGRESS NOTES
Tiffanie Jovita Black 74 y.o. female is here today for Blood Pressure check.   History of HTN no.    Review of patient's allergies indicates:   Allergen Reactions    Cleocin [clindamycin hcl] Itching and Rash    Bactrim [sulfamethoxazole-trimethoprim] Hives    Biaxin [clarithromycin] Hives     Creatinine   Date Value Ref Range Status   06/30/2020 0.8 0.5 - 1.4 mg/dL Final     Sodium   Date Value Ref Range Status   06/30/2020 139 136 - 145 mmol/L Final     Potassium   Date Value Ref Range Status   06/30/2020 3.8 3.5 - 5.1 mmol/L Final   ]  Patient denies taking blood pressure medications on a regular basis at the same time of the day.     Current Outpatient Medications:     calcium citrate (CALCITRATE) 200 mg (950 mg) tablet, Take 1,200 mg by mouth once daily. , Disp: , Rfl:     cholecalciferol, vitamin D3, 2,000 unit Tab, Take 2,000 mg by mouth once daily., Disp: , Rfl:     clobetasol (OLUX) 0.05 % Foam, APPLY TO AFFECTED AREA on scalp DAILY AS NEEDED, Disp: , Rfl: 11    cyanocobalamin 500 MCG tablet, Take 500 mcg by mouth once daily., Disp: , Rfl:     fluticasone propionate (FLONASE) 50 mcg/actuation nasal spray, 1 spray by Each Nare route once daily., Disp: , Rfl:     hydrocortisone (ANUSOL-HC) 2.5 % rectal cream, Apply rectally 2-3x a day as needed for hemorrhoids., Disp: 28 g, Rfl: 1    hyoscyamine (ANASPAZ,LEVSIN) 0.125 mg Tab, Take 1 tablet (125 mcg total) by mouth every 6 (six) hours as needed (abdominal cramping.)., Disp: 30 tablet, Rfl: 1    ketoconazole (NIZORAL) 2 % shampoo, apply to scalp leave on for 5 minutes then rinse 3times a week, Disp: , Rfl: 11    levothyroxine (SYNTHROID) 75 MCG tablet, Take 0.5 tablets (37.5 mcg total) by mouth before breakfast., Disp: 45 tablet, Rfl: 1    pantoprazole (PROTONIX) 40 MG tablet, Take 1 tablet (40 mg total) by mouth once daily., Disp: 90 tablet, Rfl: 0    pravastatin (PRAVACHOL) 80 MG tablet, Take 1 tablet (80 mg total) by mouth every evening., Disp:  90 tablet, Rfl: 1    valACYclovir (VALTREX) 1000 MG tablet, Take 1 tablet (1,000 mg total) by mouth 3 (three) times daily. for 7 days, Disp: 21 tablet, Rfl: 0    zolpidem (AMBIEN) 10 mg Tab, Take 1 tablet (10 mg total) by mouth nightly as needed., Disp: 30 tablet, Rfl: 3  Does patient have record of home blood pressure readings yes. Readings have been averaging 115/70.   Last dose of blood pressure medication was taken at .  Patient is asymptomatic.   Complains of NA.    BP: 118/74 , Pulse: 66 .

## 2020-08-01 RX ORDER — PRAVASTATIN SODIUM 40 MG/1
40 TABLET ORAL NIGHTLY
Qty: 90 TABLET | Refills: 1 | OUTPATIENT
Start: 2020-08-01

## 2020-09-29 ENCOUNTER — OFFICE VISIT (OUTPATIENT)
Dept: FAMILY MEDICINE | Facility: CLINIC | Age: 75
End: 2020-09-29
Payer: MEDICARE

## 2020-09-29 VITALS
HEIGHT: 64 IN | HEART RATE: 78 BPM | TEMPERATURE: 98 F | DIASTOLIC BLOOD PRESSURE: 76 MMHG | SYSTOLIC BLOOD PRESSURE: 118 MMHG | OXYGEN SATURATION: 99 % | BODY MASS INDEX: 21.36 KG/M2 | WEIGHT: 125.13 LBS

## 2020-09-29 DIAGNOSIS — E78.2 MIXED HYPERLIPIDEMIA: ICD-10-CM

## 2020-09-29 DIAGNOSIS — Z01.89 ENCOUNTER FOR LABORATORY TEST: ICD-10-CM

## 2020-09-29 DIAGNOSIS — E55.9 VITAMIN D INSUFFICIENCY: ICD-10-CM

## 2020-09-29 DIAGNOSIS — E03.9 HYPOTHYROIDISM, UNSPECIFIED TYPE: ICD-10-CM

## 2020-09-29 DIAGNOSIS — M85.859 OSTEOPENIA OF THIGH, UNSPECIFIED LATERALITY: ICD-10-CM

## 2020-09-29 DIAGNOSIS — I10 ESSENTIAL HYPERTENSION: Primary | ICD-10-CM

## 2020-09-29 DIAGNOSIS — R73.01 IMPAIRED FASTING GLUCOSE: ICD-10-CM

## 2020-09-29 PROCEDURE — 99999 PR PBB SHADOW E&M-EST. PATIENT-LVL IV: CPT | Mod: PBBFAC,,, | Performed by: INTERNAL MEDICINE

## 2020-09-29 PROCEDURE — 99999 PR PBB SHADOW E&M-EST. PATIENT-LVL IV: ICD-10-PCS | Mod: PBBFAC,,, | Performed by: INTERNAL MEDICINE

## 2020-09-29 PROCEDURE — 99214 OFFICE O/P EST MOD 30 MIN: CPT | Mod: PBBFAC,PN,25 | Performed by: INTERNAL MEDICINE

## 2020-09-29 PROCEDURE — G0008 ADMIN INFLUENZA VIRUS VAC: HCPCS | Mod: PBBFAC,PN

## 2020-09-29 PROCEDURE — 90694 VACC AIIV4 NO PRSRV 0.5ML IM: CPT | Mod: PBBFAC,PN

## 2020-09-29 PROCEDURE — 99213 PR OFFICE/OUTPT VISIT, EST, LEVL III, 20-29 MIN: ICD-10-PCS | Mod: S$PBB,,, | Performed by: INTERNAL MEDICINE

## 2020-09-29 PROCEDURE — 99213 OFFICE O/P EST LOW 20 MIN: CPT | Mod: S$PBB,,, | Performed by: INTERNAL MEDICINE

## 2020-09-29 NOTE — PROGRESS NOTES
Subjective:       Patient ID: Tiffanie Black is a 75 y.o. female.    Chief Complaint: Follow-up (bp check) and Immunizations (flu)    HPI  Here today for HTN BP check; has been avr 112-124/73-80 at home. Manual today 138/80; watch her diet a little closer; exercise regularly.     Hypothyroid: takes med appropriately; TFT on f/u. 06/30/2020 TSH 1.28     Mix HLP: on low fat high fiber diet; tolerates pravastatin fine.  Moderate ASCVD risk of 12.3%.     IFBS: watches her carb intake; exercise regularly advised.      Osteopenia:  01/04/2019 DEXA scan:  T-score is lumbar spine +1.7; femoral bone density T-score-1.4; previously-1.3.  Weight-bearing exercise and continue calcium and vitamin-D supplementation DEXA in 2 years or after 01/04/2021     Vitamin-D insufficiency:  Takes her vitamin-D supplements; 06/30/2020 vitamin-D level 44.     Encounter for lab test:  Labs reviewed with patient and ordered for follow-up; DEXA scan also reviewed with patient    The 10-year ASCVD risk score ([employer] CAROLEE Jr., et al., 2013) is: 12.3%    Values used to calculate the score:      Age: 75 years      Sex: Female      Is Non- : No      Diabetic: No      Tobacco smoker: No      Systolic Blood Pressure: 118 mmHg      Is BP treated: No      HDL Cholesterol: 63 mg/dL      Total Cholesterol: 179 mg/dL      Review of Systems   Constitutional: Negative for appetite change and fever.   HENT: Negative for congestion, postnasal drip, rhinorrhea and sinus pressure.    Eyes: Negative for discharge and itching.   Respiratory: Negative for cough, chest tightness, shortness of breath and wheezing.    Cardiovascular: Negative for chest pain, palpitations and leg swelling.   Gastrointestinal: Negative for abdominal distention, abdominal pain, blood in stool, constipation, diarrhea, nausea and vomiting.   Endocrine: Negative for polydipsia, polyphagia and polyuria.   Genitourinary: Negative for dysuria and hematuria.  "  Musculoskeletal: Negative for arthralgias and myalgias.   Skin: Negative for rash.   Allergic/Immunologic: Negative for environmental allergies and food allergies.   Neurological: Negative for tremors, seizures and syncope.   Hematological: Negative for adenopathy. Does not bruise/bleed easily.       Objective:      Vitals:    09/29/20 0943 09/29/20 1112   BP: 138/80 118/76  Comment: avr at home w new cuff   BP Location: Right arm    Patient Position: Sitting    BP Method: Medium (Manual)    Pulse: 78    Temp: 97.9 °F (36.6 °C)    TempSrc: Temporal    SpO2: 99%    Weight: 56.8 kg (125 lb 1.8 oz)    Height: 5' 4" (1.626 m)      Body mass index is 21.48 kg/m².  Wt Readings from Last 3 Encounters:   09/29/20 56.8 kg (125 lb 1.8 oz)   07/07/20 56.5 kg (124 lb 9 oz)   06/17/20 56.2 kg (124 lb)        Physical Exam  Constitutional:       General: She is not in acute distress.     Appearance: She is well-developed.   HENT:      Head: Normocephalic and atraumatic.   Neck:      Musculoskeletal: Normal range of motion.      Vascular: No carotid bruit.   Pulmonary:      Effort: Pulmonary effort is normal. No respiratory distress.   Musculoskeletal:      Right lower leg: No edema.      Left lower leg: No edema.   Neurological:      Mental Status: She is alert and oriented to person, place, and time.   Psychiatric:         Behavior: Behavior normal.         Thought Content: Thought content normal.         Judgment: Judgment normal.         Assessment:       1. Essential hypertension    2. Mixed hyperlipidemia    3. Hypothyroidism, unspecified type    4. Impaired fasting glucose    5. Osteopenia of thigh, unspecified laterality    6. Vitamin D insufficiency    7. Encounter for laboratory test        Plan:       HTN: Maintain < 2 Gm Na a day diet, and monitor BP at home; keep a log. Watch diet closer, exercise needed.  Same management.    Mixed hyperlipidemia: Maintain low fat high fiber diet, exercise regularly. Weight " reduction where indicated. Cont pravastatin; lipids on f/u.     Hypothyroidism, unspecified type: takes levothyroxine; TFT on f/u.  Continue levothyroxine 1/2 of 75 mcg p.o. daily    Impaired fasting glucose: Exercise recommended with weight reduction and low carb diet; we'll follow hemoglobin A1c's with you periodically.    Osteopenia of thigh, unspecified laterality: Weight bearing exercises, continue calcium and vit D supplements. DEXA scabn at 2 yr intervals as needed.  DEXA last in epic 01/04/2019 with T-score femoral bone density -1.4 or mildly osteopenic; with lumbar spine normal T-score at 1.7 likely within osteoarthritic component.  Repeat DEXA in 2 years or after 01/04/2021    Vitamin D insufficiency: vit D supplements to cont.     Encounter for lab test:  Labs reviewed with patient and ordered for follow-up.    Other orders  -     Influenza - Quadrivalent (Adjuvanted)

## 2020-09-29 NOTE — PATIENT INSTRUCTIONS
HTN: Maintain < 2 Gm Na a day diet, and monitor BP at home; keep a log. Watch diet closer, exercise needed.     Mixed hyperlipidemia: Maintain low fat high fiber diet, exercise regularly. Weight reduction where indicated. Cont pravastatin; lipids on f/u.     Hypothyroidism, unspecified type: takes levothyroxine; TFT on f/u.    Impaired fasting glucose: Exercise recommended with weight reduction and low carb diet; we'll follow hemoglobin A1c's with you periodically.    Osteopenia of thigh, unspecified laterality: Weight bearing exercises, continue calcium and vit D supplements. DEXA scabn at 2 yr intervals as needed.    Vitamin D insufficiency: vit D supplements to cont.     Other orders  -     Influenza - Quadrivalent (Adjuvanted)

## 2020-10-20 DIAGNOSIS — E03.9 HYPOTHYROIDISM, UNSPECIFIED TYPE: ICD-10-CM

## 2020-10-24 RX ORDER — LEVOTHYROXINE SODIUM 75 UG/1
37.5 TABLET ORAL
Qty: 45 TABLET | Refills: 3 | Status: SHIPPED | OUTPATIENT
Start: 2020-10-24 | End: 2021-01-20 | Stop reason: SDUPTHER

## 2021-01-07 ENCOUNTER — LAB VISIT (OUTPATIENT)
Dept: LAB | Facility: HOSPITAL | Age: 76
End: 2021-01-07
Attending: INTERNAL MEDICINE
Payer: MEDICARE

## 2021-01-07 DIAGNOSIS — E55.9 VITAMIN D INSUFFICIENCY: ICD-10-CM

## 2021-01-07 DIAGNOSIS — D72.819 LEUKOPENIA, UNSPECIFIED TYPE: ICD-10-CM

## 2021-01-07 DIAGNOSIS — E78.2 MIXED HYPERLIPIDEMIA: ICD-10-CM

## 2021-01-07 DIAGNOSIS — E03.9 HYPOTHYROIDISM, UNSPECIFIED TYPE: ICD-10-CM

## 2021-01-07 DIAGNOSIS — I10 ESSENTIAL HYPERTENSION: ICD-10-CM

## 2021-01-07 DIAGNOSIS — Z78.9 ALCOHOL USE: ICD-10-CM

## 2021-01-07 DIAGNOSIS — M85.859 OSTEOPENIA OF THIGH, UNSPECIFIED LATERALITY: ICD-10-CM

## 2021-01-07 DIAGNOSIS — R73.01 IMPAIRED FASTING GLUCOSE: ICD-10-CM

## 2021-01-07 LAB
ALBUMIN SERPL BCP-MCNC: 4 G/DL (ref 3.5–5.2)
ALP SERPL-CCNC: 82 U/L (ref 55–135)
ALT SERPL W/O P-5'-P-CCNC: 19 U/L (ref 10–44)
ANION GAP SERPL CALC-SCNC: 8 MMOL/L (ref 8–16)
AST SERPL-CCNC: 21 U/L (ref 10–40)
BASOPHILS # BLD AUTO: 0.02 K/UL (ref 0–0.2)
BASOPHILS NFR BLD: 0.5 % (ref 0–1.9)
BILIRUB SERPL-MCNC: 0.4 MG/DL (ref 0.1–1)
BUN SERPL-MCNC: 13 MG/DL (ref 8–23)
CALCIUM SERPL-MCNC: 9.1 MG/DL (ref 8.7–10.5)
CHLORIDE SERPL-SCNC: 106 MMOL/L (ref 95–110)
CHOLEST SERPL-MCNC: 172 MG/DL (ref 120–199)
CHOLEST/HDLC SERPL: 2.6 {RATIO} (ref 2–5)
CO2 SERPL-SCNC: 28 MMOL/L (ref 23–29)
CREAT SERPL-MCNC: 0.7 MG/DL (ref 0.5–1.4)
DIFFERENTIAL METHOD: ABNORMAL
EOSINOPHIL # BLD AUTO: 0.1 K/UL (ref 0–0.5)
EOSINOPHIL NFR BLD: 3.3 % (ref 0–8)
ERYTHROCYTE [DISTWIDTH] IN BLOOD BY AUTOMATED COUNT: 13.1 % (ref 11.5–14.5)
EST. GFR  (AFRICAN AMERICAN): >60 ML/MIN/1.73 M^2
EST. GFR  (NON AFRICAN AMERICAN): >60 ML/MIN/1.73 M^2
ESTIMATED AVG GLUCOSE: 105 MG/DL (ref 68–131)
GLUCOSE SERPL-MCNC: 98 MG/DL (ref 70–110)
HBA1C MFR BLD HPLC: 5.3 % (ref 4–5.6)
HCT VFR BLD AUTO: 43.6 % (ref 37–48.5)
HDLC SERPL-MCNC: 65 MG/DL (ref 40–75)
HDLC SERPL: 37.8 % (ref 20–50)
HGB BLD-MCNC: 13.4 G/DL (ref 12–16)
IMM GRANULOCYTES # BLD AUTO: 0.01 K/UL (ref 0–0.04)
IMM GRANULOCYTES NFR BLD AUTO: 0.2 % (ref 0–0.5)
LDLC SERPL CALC-MCNC: 93 MG/DL (ref 63–159)
LYMPHOCYTES # BLD AUTO: 1.3 K/UL (ref 1–4.8)
LYMPHOCYTES NFR BLD: 29.8 % (ref 18–48)
MAGNESIUM SERPL-MCNC: 2 MG/DL (ref 1.6–2.6)
MCH RBC QN AUTO: 30 PG (ref 27–31)
MCHC RBC AUTO-ENTMCNC: 30.7 G/DL (ref 32–36)
MCV RBC AUTO: 98 FL (ref 82–98)
MONOCYTES # BLD AUTO: 0.6 K/UL (ref 0.3–1)
MONOCYTES NFR BLD: 15.2 % (ref 4–15)
NEUTROPHILS # BLD AUTO: 2.1 K/UL (ref 1.8–7.7)
NEUTROPHILS NFR BLD: 51 % (ref 38–73)
NONHDLC SERPL-MCNC: 107 MG/DL
NRBC BLD-RTO: 0 /100 WBC
PLATELET # BLD AUTO: 142 K/UL (ref 150–350)
PMV BLD AUTO: 11.5 FL (ref 9.2–12.9)
POTASSIUM SERPL-SCNC: 4.3 MMOL/L (ref 3.5–5.1)
PROT SERPL-MCNC: 7.1 G/DL (ref 6–8.4)
RBC # BLD AUTO: 4.47 M/UL (ref 4–5.4)
SODIUM SERPL-SCNC: 142 MMOL/L (ref 136–145)
T3FREE SERPL-MCNC: 2.5 PG/ML (ref 2.3–4.2)
T4 FREE SERPL-MCNC: 0.91 NG/DL (ref 0.71–1.51)
TRIGL SERPL-MCNC: 70 MG/DL (ref 30–150)
TSH SERPL DL<=0.005 MIU/L-ACNC: 2.01 UIU/ML (ref 0.4–4)
WBC # BLD AUTO: 4.2 K/UL (ref 3.9–12.7)

## 2021-01-07 PROCEDURE — 80061 LIPID PANEL: CPT

## 2021-01-07 PROCEDURE — 83036 HEMOGLOBIN GLYCOSYLATED A1C: CPT

## 2021-01-07 PROCEDURE — 80053 COMPREHEN METABOLIC PANEL: CPT

## 2021-01-07 PROCEDURE — 36415 COLL VENOUS BLD VENIPUNCTURE: CPT | Mod: PN

## 2021-01-07 PROCEDURE — 84481 FREE ASSAY (FT-3): CPT

## 2021-01-07 PROCEDURE — 82306 VITAMIN D 25 HYDROXY: CPT

## 2021-01-07 PROCEDURE — 83735 ASSAY OF MAGNESIUM: CPT

## 2021-01-07 PROCEDURE — 85025 COMPLETE CBC W/AUTO DIFF WBC: CPT

## 2021-01-07 PROCEDURE — 84439 ASSAY OF FREE THYROXINE: CPT

## 2021-01-07 PROCEDURE — 84443 ASSAY THYROID STIM HORMONE: CPT

## 2021-01-08 LAB — 25(OH)D3+25(OH)D2 SERPL-MCNC: 38 NG/ML (ref 30–96)

## 2021-01-13 ENCOUNTER — IMMUNIZATION (OUTPATIENT)
Dept: FAMILY MEDICINE | Facility: CLINIC | Age: 76
End: 2021-01-13
Payer: MEDICARE

## 2021-01-13 DIAGNOSIS — Z23 NEED FOR VACCINATION: ICD-10-CM

## 2021-01-13 PROCEDURE — 91300 COVID-19, MRNA, LNP-S, PF, 30 MCG/0.3 ML DOSE VACCINE: CPT | Mod: PBBFAC,PO

## 2021-01-14 ENCOUNTER — OFFICE VISIT (OUTPATIENT)
Dept: FAMILY MEDICINE | Facility: CLINIC | Age: 76
End: 2021-01-14
Payer: MEDICARE

## 2021-01-14 VITALS
HEART RATE: 76 BPM | HEIGHT: 64 IN | SYSTOLIC BLOOD PRESSURE: 128 MMHG | OXYGEN SATURATION: 97 % | DIASTOLIC BLOOD PRESSURE: 86 MMHG | WEIGHT: 129 LBS | BODY MASS INDEX: 22.02 KG/M2

## 2021-01-14 DIAGNOSIS — E55.9 VITAMIN D INSUFFICIENCY: ICD-10-CM

## 2021-01-14 DIAGNOSIS — M85.859 OSTEOPENIA OF THIGH, UNSPECIFIED LATERALITY: ICD-10-CM

## 2021-01-14 DIAGNOSIS — R73.01 IMPAIRED FASTING GLUCOSE: ICD-10-CM

## 2021-01-14 DIAGNOSIS — D69.6 THROMBOCYTOPENIA: ICD-10-CM

## 2021-01-14 DIAGNOSIS — Z78.9 ALCOHOL USE: ICD-10-CM

## 2021-01-14 DIAGNOSIS — E53.8 VITAMIN B12 DEFICIENCY: ICD-10-CM

## 2021-01-14 DIAGNOSIS — J30.2 SEASONAL ALLERGIES: ICD-10-CM

## 2021-01-14 DIAGNOSIS — Z78.0 POST-MENOPAUSAL: ICD-10-CM

## 2021-01-14 DIAGNOSIS — E03.9 HYPOTHYROIDISM, UNSPECIFIED TYPE: ICD-10-CM

## 2021-01-14 DIAGNOSIS — E78.2 MIXED HYPERLIPIDEMIA: Primary | ICD-10-CM

## 2021-01-14 PROCEDURE — 99214 OFFICE O/P EST MOD 30 MIN: CPT | Mod: S$PBB,,, | Performed by: INTERNAL MEDICINE

## 2021-01-14 PROCEDURE — 99214 OFFICE O/P EST MOD 30 MIN: CPT | Mod: PBBFAC,PN | Performed by: INTERNAL MEDICINE

## 2021-01-14 PROCEDURE — 99999 PR PBB SHADOW E&M-EST. PATIENT-LVL IV: CPT | Mod: PBBFAC,,, | Performed by: INTERNAL MEDICINE

## 2021-01-14 PROCEDURE — 99214 PR OFFICE/OUTPT VISIT, EST, LEVL IV, 30-39 MIN: ICD-10-PCS | Mod: S$PBB,,, | Performed by: INTERNAL MEDICINE

## 2021-01-14 PROCEDURE — 99999 PR PBB SHADOW E&M-EST. PATIENT-LVL IV: ICD-10-PCS | Mod: PBBFAC,,, | Performed by: INTERNAL MEDICINE

## 2021-01-20 DIAGNOSIS — E03.9 HYPOTHYROIDISM, UNSPECIFIED TYPE: ICD-10-CM

## 2021-01-22 RX ORDER — LEVOTHYROXINE SODIUM 75 UG/1
37.5 TABLET ORAL
Qty: 45 TABLET | Refills: 3 | Status: SHIPPED | OUTPATIENT
Start: 2021-01-22 | End: 2022-02-11

## 2021-01-24 ENCOUNTER — TELEPHONE (OUTPATIENT)
Dept: FAMILY MEDICINE | Facility: CLINIC | Age: 76
End: 2021-01-24

## 2021-02-03 ENCOUNTER — IMMUNIZATION (OUTPATIENT)
Dept: FAMILY MEDICINE | Facility: CLINIC | Age: 76
End: 2021-02-03
Payer: MEDICARE

## 2021-02-03 DIAGNOSIS — Z23 NEED FOR VACCINATION: Primary | ICD-10-CM

## 2021-02-03 PROCEDURE — 0002A COVID-19, MRNA, LNP-S, PF, 30 MCG/0.3 ML DOSE VACCINE: CPT | Mod: PBBFAC,PO

## 2021-02-03 PROCEDURE — 91300 COVID-19, MRNA, LNP-S, PF, 30 MCG/0.3 ML DOSE VACCINE: CPT | Mod: PBBFAC,PO

## 2021-03-26 ENCOUNTER — PATIENT MESSAGE (OUTPATIENT)
Dept: FAMILY MEDICINE | Facility: CLINIC | Age: 76
End: 2021-03-26

## 2021-04-01 DIAGNOSIS — E78.2 MIXED HYPERLIPIDEMIA: ICD-10-CM

## 2021-04-01 RX ORDER — PRAVASTATIN SODIUM 80 MG/1
80 TABLET ORAL NIGHTLY
Qty: 90 TABLET | Refills: 1 | Status: SHIPPED | OUTPATIENT
Start: 2021-04-01 | End: 2021-06-28 | Stop reason: SDUPTHER

## 2021-04-15 ENCOUNTER — LAB VISIT (OUTPATIENT)
Dept: LAB | Facility: HOSPITAL | Age: 76
End: 2021-04-15
Attending: INTERNAL MEDICINE
Payer: MEDICARE

## 2021-04-15 DIAGNOSIS — M85.859 OSTEOPENIA OF THIGH, UNSPECIFIED LATERALITY: ICD-10-CM

## 2021-04-15 DIAGNOSIS — R73.01 IMPAIRED FASTING GLUCOSE: ICD-10-CM

## 2021-04-15 DIAGNOSIS — D69.6 THROMBOCYTOPENIA: ICD-10-CM

## 2021-04-15 DIAGNOSIS — E53.8 VITAMIN B12 DEFICIENCY: ICD-10-CM

## 2021-04-15 DIAGNOSIS — E55.9 VITAMIN D INSUFFICIENCY: ICD-10-CM

## 2021-04-15 DIAGNOSIS — Z78.9 ALCOHOL USE: ICD-10-CM

## 2021-04-15 LAB
25(OH)D3+25(OH)D2 SERPL-MCNC: 55 NG/ML (ref 30–96)
BASOPHILS # BLD AUTO: 0.02 K/UL (ref 0–0.2)
BASOPHILS NFR BLD: 0.4 % (ref 0–1.9)
CALCIUM SERPL-MCNC: 9.4 MG/DL (ref 8.7–10.5)
DIFFERENTIAL METHOD: ABNORMAL
EOSINOPHIL # BLD AUTO: 0.1 K/UL (ref 0–0.5)
EOSINOPHIL NFR BLD: 2.4 % (ref 0–8)
ERYTHROCYTE [DISTWIDTH] IN BLOOD BY AUTOMATED COUNT: 13.2 % (ref 11.5–14.5)
GLUCOSE SERPL-MCNC: 103 MG/DL (ref 70–110)
HCT VFR BLD AUTO: 43.5 % (ref 37–48.5)
HGB BLD-MCNC: 14 G/DL (ref 12–16)
IMM GRANULOCYTES # BLD AUTO: 0.01 K/UL (ref 0–0.04)
IMM GRANULOCYTES NFR BLD AUTO: 0.2 % (ref 0–0.5)
LYMPHOCYTES # BLD AUTO: 1.4 K/UL (ref 1–4.8)
LYMPHOCYTES NFR BLD: 29 % (ref 18–48)
MCH RBC QN AUTO: 31.3 PG (ref 27–31)
MCHC RBC AUTO-ENTMCNC: 32.2 G/DL (ref 32–36)
MCV RBC AUTO: 97 FL (ref 82–98)
MONOCYTES # BLD AUTO: 0.7 K/UL (ref 0.3–1)
MONOCYTES NFR BLD: 13.5 % (ref 4–15)
NEUTROPHILS # BLD AUTO: 2.7 K/UL (ref 1.8–7.7)
NEUTROPHILS NFR BLD: 54.5 % (ref 38–73)
NRBC BLD-RTO: 0 /100 WBC
PLATELET # BLD AUTO: 193 K/UL (ref 150–450)
PMV BLD AUTO: 11.2 FL (ref 9.2–12.9)
RBC # BLD AUTO: 4.48 M/UL (ref 4–5.4)
VIT B12 SERPL-MCNC: 546 PG/ML (ref 210–950)
WBC # BLD AUTO: 4.96 K/UL (ref 3.9–12.7)

## 2021-04-15 PROCEDURE — 82947 ASSAY GLUCOSE BLOOD QUANT: CPT | Performed by: INTERNAL MEDICINE

## 2021-04-15 PROCEDURE — 82306 VITAMIN D 25 HYDROXY: CPT | Performed by: INTERNAL MEDICINE

## 2021-04-15 PROCEDURE — 82310 ASSAY OF CALCIUM: CPT | Performed by: INTERNAL MEDICINE

## 2021-04-15 PROCEDURE — 82607 VITAMIN B-12: CPT | Performed by: INTERNAL MEDICINE

## 2021-04-15 PROCEDURE — 85025 COMPLETE CBC W/AUTO DIFF WBC: CPT | Performed by: INTERNAL MEDICINE

## 2021-04-15 PROCEDURE — 36415 COLL VENOUS BLD VENIPUNCTURE: CPT | Mod: PN | Performed by: INTERNAL MEDICINE

## 2021-04-21 ENCOUNTER — OFFICE VISIT (OUTPATIENT)
Dept: FAMILY MEDICINE | Facility: CLINIC | Age: 76
End: 2021-04-21
Payer: MEDICARE

## 2021-04-21 VITALS
HEART RATE: 68 BPM | DIASTOLIC BLOOD PRESSURE: 84 MMHG | HEIGHT: 64 IN | WEIGHT: 129.06 LBS | OXYGEN SATURATION: 97 % | BODY MASS INDEX: 22.03 KG/M2 | SYSTOLIC BLOOD PRESSURE: 124 MMHG

## 2021-04-21 DIAGNOSIS — E78.2 MIXED HYPERLIPIDEMIA: Primary | ICD-10-CM

## 2021-04-21 DIAGNOSIS — E03.9 HYPOTHYROIDISM, UNSPECIFIED TYPE: ICD-10-CM

## 2021-04-21 DIAGNOSIS — E53.8 VITAMIN B12 DEFICIENCY: ICD-10-CM

## 2021-04-21 DIAGNOSIS — J30.2 SEASONAL ALLERGIES: ICD-10-CM

## 2021-04-21 DIAGNOSIS — R73.01 IMPAIRED FASTING GLUCOSE: ICD-10-CM

## 2021-04-21 DIAGNOSIS — E55.9 VITAMIN D INSUFFICIENCY: ICD-10-CM

## 2021-04-21 DIAGNOSIS — R03.0 ELEVATED BLOOD PRESSURE READING IN OFFICE WITHOUT DIAGNOSIS OF HYPERTENSION: ICD-10-CM

## 2021-04-21 DIAGNOSIS — M85.859 OSTEOPENIA OF THIGH, UNSPECIFIED LATERALITY: ICD-10-CM

## 2021-04-21 DIAGNOSIS — D69.6 THROMBOCYTOPENIA: ICD-10-CM

## 2021-04-21 PROCEDURE — 99999 PR PBB SHADOW E&M-EST. PATIENT-LVL IV: CPT | Mod: PBBFAC,,, | Performed by: INTERNAL MEDICINE

## 2021-04-21 PROCEDURE — 99214 PR OFFICE/OUTPT VISIT, EST, LEVL IV, 30-39 MIN: ICD-10-PCS | Mod: S$PBB,,, | Performed by: INTERNAL MEDICINE

## 2021-04-21 PROCEDURE — 99214 OFFICE O/P EST MOD 30 MIN: CPT | Mod: S$PBB,,, | Performed by: INTERNAL MEDICINE

## 2021-04-21 PROCEDURE — 99999 PR PBB SHADOW E&M-EST. PATIENT-LVL IV: ICD-10-PCS | Mod: PBBFAC,,, | Performed by: INTERNAL MEDICINE

## 2021-04-21 PROCEDURE — 99214 OFFICE O/P EST MOD 30 MIN: CPT | Mod: PBBFAC,PN | Performed by: INTERNAL MEDICINE

## 2021-06-28 DIAGNOSIS — K64.9 HEMORRHOIDS, UNSPECIFIED HEMORRHOID TYPE: ICD-10-CM

## 2021-06-28 DIAGNOSIS — E78.2 MIXED HYPERLIPIDEMIA: ICD-10-CM

## 2021-06-28 DIAGNOSIS — K92.2 LGI BLEED: ICD-10-CM

## 2021-06-29 RX ORDER — HYDROCORTISONE 25 MG/G
CREAM TOPICAL
Qty: 28 G | Refills: 1 | Status: SHIPPED | OUTPATIENT
Start: 2021-06-29 | End: 2022-07-05

## 2021-06-29 RX ORDER — PRAVASTATIN SODIUM 80 MG/1
80 TABLET ORAL NIGHTLY
Qty: 90 TABLET | Refills: 1 | Status: SHIPPED | OUTPATIENT
Start: 2021-06-29 | End: 2021-10-17

## 2021-08-24 ENCOUNTER — LAB VISIT (OUTPATIENT)
Dept: LAB | Facility: HOSPITAL | Age: 76
End: 2021-08-24
Attending: INTERNAL MEDICINE
Payer: MEDICARE

## 2021-08-24 DIAGNOSIS — E78.2 MIXED HYPERLIPIDEMIA: ICD-10-CM

## 2021-08-24 DIAGNOSIS — R73.01 IMPAIRED FASTING GLUCOSE: ICD-10-CM

## 2021-08-24 DIAGNOSIS — E03.9 HYPOTHYROIDISM, UNSPECIFIED TYPE: ICD-10-CM

## 2021-08-24 DIAGNOSIS — D69.6 THROMBOCYTOPENIA: ICD-10-CM

## 2021-08-24 DIAGNOSIS — E55.9 VITAMIN D INSUFFICIENCY: ICD-10-CM

## 2021-08-24 LAB
25(OH)D3+25(OH)D2 SERPL-MCNC: 39 NG/ML (ref 30–96)
ALBUMIN SERPL BCP-MCNC: 4 G/DL (ref 3.5–5.2)
ALP SERPL-CCNC: 70 U/L (ref 55–135)
ALT SERPL W/O P-5'-P-CCNC: 19 U/L (ref 10–44)
ANION GAP SERPL CALC-SCNC: 9 MMOL/L (ref 8–16)
AST SERPL-CCNC: 21 U/L (ref 10–40)
BASOPHILS # BLD AUTO: 0.01 K/UL (ref 0–0.2)
BASOPHILS NFR BLD: 0.2 % (ref 0–1.9)
BILIRUB SERPL-MCNC: 0.3 MG/DL (ref 0.1–1)
BUN SERPL-MCNC: 13 MG/DL (ref 8–23)
CALCIUM SERPL-MCNC: 9.4 MG/DL (ref 8.7–10.5)
CHLORIDE SERPL-SCNC: 106 MMOL/L (ref 95–110)
CHOLEST SERPL-MCNC: 163 MG/DL (ref 120–199)
CHOLEST/HDLC SERPL: 2.9 {RATIO} (ref 2–5)
CO2 SERPL-SCNC: 25 MMOL/L (ref 23–29)
CREAT SERPL-MCNC: 0.7 MG/DL (ref 0.5–1.4)
DIFFERENTIAL METHOD: ABNORMAL
EOSINOPHIL # BLD AUTO: 0.1 K/UL (ref 0–0.5)
EOSINOPHIL NFR BLD: 2.5 % (ref 0–8)
ERYTHROCYTE [DISTWIDTH] IN BLOOD BY AUTOMATED COUNT: 13.2 % (ref 11.5–14.5)
EST. GFR  (AFRICAN AMERICAN): >60 ML/MIN/1.73 M^2
EST. GFR  (NON AFRICAN AMERICAN): >60 ML/MIN/1.73 M^2
ESTIMATED AVG GLUCOSE: 105 MG/DL (ref 68–131)
GLUCOSE SERPL-MCNC: 86 MG/DL (ref 70–110)
HBA1C MFR BLD: 5.3 % (ref 4–5.6)
HCT VFR BLD AUTO: 43.6 % (ref 37–48.5)
HDLC SERPL-MCNC: 57 MG/DL (ref 40–75)
HDLC SERPL: 35 % (ref 20–50)
HGB BLD-MCNC: 13.8 G/DL (ref 12–16)
IMM GRANULOCYTES # BLD AUTO: 0.01 K/UL (ref 0–0.04)
IMM GRANULOCYTES NFR BLD AUTO: 0.2 % (ref 0–0.5)
LDLC SERPL CALC-MCNC: 91.6 MG/DL (ref 63–159)
LYMPHOCYTES # BLD AUTO: 1.3 K/UL (ref 1–4.8)
LYMPHOCYTES NFR BLD: 29.2 % (ref 18–48)
MCH RBC QN AUTO: 30.4 PG (ref 27–31)
MCHC RBC AUTO-ENTMCNC: 31.7 G/DL (ref 32–36)
MCV RBC AUTO: 96 FL (ref 82–98)
MONOCYTES # BLD AUTO: 0.6 K/UL (ref 0.3–1)
MONOCYTES NFR BLD: 13.9 % (ref 4–15)
NEUTROPHILS # BLD AUTO: 2.4 K/UL (ref 1.8–7.7)
NEUTROPHILS NFR BLD: 54 % (ref 38–73)
NONHDLC SERPL-MCNC: 106 MG/DL
NRBC BLD-RTO: 0 /100 WBC
PLATELET # BLD AUTO: 218 K/UL (ref 150–450)
PMV BLD AUTO: 10.9 FL (ref 9.2–12.9)
POTASSIUM SERPL-SCNC: 4.1 MMOL/L (ref 3.5–5.1)
PROT SERPL-MCNC: 7.3 G/DL (ref 6–8.4)
RBC # BLD AUTO: 4.54 M/UL (ref 4–5.4)
SODIUM SERPL-SCNC: 140 MMOL/L (ref 136–145)
T3FREE SERPL-MCNC: 2.6 PG/ML (ref 2.3–4.2)
T4 FREE SERPL-MCNC: 0.78 NG/DL (ref 0.71–1.51)
TRIGL SERPL-MCNC: 72 MG/DL (ref 30–150)
TSH SERPL DL<=0.005 MIU/L-ACNC: 1.39 UIU/ML (ref 0.4–4)
WBC # BLD AUTO: 4.39 K/UL (ref 3.9–12.7)

## 2021-08-24 PROCEDURE — 84443 ASSAY THYROID STIM HORMONE: CPT | Performed by: INTERNAL MEDICINE

## 2021-08-24 PROCEDURE — 85025 COMPLETE CBC W/AUTO DIFF WBC: CPT | Performed by: INTERNAL MEDICINE

## 2021-08-24 PROCEDURE — 80053 COMPREHEN METABOLIC PANEL: CPT | Performed by: INTERNAL MEDICINE

## 2021-08-24 PROCEDURE — 82306 VITAMIN D 25 HYDROXY: CPT | Performed by: INTERNAL MEDICINE

## 2021-08-24 PROCEDURE — 36415 COLL VENOUS BLD VENIPUNCTURE: CPT | Mod: PN | Performed by: INTERNAL MEDICINE

## 2021-08-24 PROCEDURE — 84481 FREE ASSAY (FT-3): CPT | Performed by: INTERNAL MEDICINE

## 2021-08-24 PROCEDURE — 80061 LIPID PANEL: CPT | Performed by: INTERNAL MEDICINE

## 2021-08-24 PROCEDURE — 83036 HEMOGLOBIN GLYCOSYLATED A1C: CPT | Performed by: INTERNAL MEDICINE

## 2021-08-24 PROCEDURE — 84439 ASSAY OF FREE THYROXINE: CPT | Performed by: INTERNAL MEDICINE

## 2021-08-27 ENCOUNTER — OFFICE VISIT (OUTPATIENT)
Dept: FAMILY MEDICINE | Facility: CLINIC | Age: 76
End: 2021-08-27
Payer: MEDICARE

## 2021-08-27 VITALS
DIASTOLIC BLOOD PRESSURE: 88 MMHG | BODY MASS INDEX: 21.79 KG/M2 | HEIGHT: 64 IN | HEART RATE: 76 BPM | WEIGHT: 127.63 LBS | SYSTOLIC BLOOD PRESSURE: 130 MMHG

## 2021-08-27 DIAGNOSIS — R73.01 IMPAIRED FASTING GLUCOSE: ICD-10-CM

## 2021-08-27 DIAGNOSIS — M85.859 OSTEOPENIA OF THIGH, UNSPECIFIED LATERALITY: ICD-10-CM

## 2021-08-27 DIAGNOSIS — E03.9 HYPOTHYROIDISM, UNSPECIFIED TYPE: ICD-10-CM

## 2021-08-27 DIAGNOSIS — Z01.89 ENCOUNTER FOR LABORATORY TEST: ICD-10-CM

## 2021-08-27 DIAGNOSIS — E78.2 MIXED HYPERLIPIDEMIA: Primary | ICD-10-CM

## 2021-08-27 DIAGNOSIS — E55.9 VITAMIN D INSUFFICIENCY: ICD-10-CM

## 2021-08-27 PROCEDURE — 99214 PR OFFICE/OUTPT VISIT, EST, LEVL IV, 30-39 MIN: ICD-10-PCS | Mod: S$PBB,,, | Performed by: INTERNAL MEDICINE

## 2021-08-27 PROCEDURE — 99999 PR PBB SHADOW E&M-EST. PATIENT-LVL IV: CPT | Mod: PBBFAC,,, | Performed by: INTERNAL MEDICINE

## 2021-08-27 PROCEDURE — 99999 PR PBB SHADOW E&M-EST. PATIENT-LVL IV: ICD-10-PCS | Mod: PBBFAC,,, | Performed by: INTERNAL MEDICINE

## 2021-08-27 PROCEDURE — 99214 OFFICE O/P EST MOD 30 MIN: CPT | Mod: S$PBB,,, | Performed by: INTERNAL MEDICINE

## 2021-08-27 PROCEDURE — 99214 OFFICE O/P EST MOD 30 MIN: CPT | Mod: PBBFAC,PN | Performed by: INTERNAL MEDICINE

## 2021-10-14 DIAGNOSIS — E78.2 MIXED HYPERLIPIDEMIA: ICD-10-CM

## 2021-10-17 RX ORDER — PRAVASTATIN SODIUM 80 MG/1
80 TABLET ORAL NIGHTLY
Qty: 90 TABLET | Refills: 1 | Status: SHIPPED | OUTPATIENT
Start: 2021-10-17 | End: 2022-08-15 | Stop reason: SDUPTHER

## 2021-11-04 ENCOUNTER — IMMUNIZATION (OUTPATIENT)
Dept: FAMILY MEDICINE | Facility: CLINIC | Age: 76
End: 2021-11-04
Payer: MEDICARE

## 2021-11-04 DIAGNOSIS — Z23 NEED FOR VACCINATION: Primary | ICD-10-CM

## 2021-11-04 PROCEDURE — 0004A COVID-19, MRNA, LNP-S, PF, 30 MCG/0.3 ML DOSE VACCINE: CPT | Mod: PBBFAC,PO

## 2022-02-09 DIAGNOSIS — E03.9 HYPOTHYROIDISM, UNSPECIFIED TYPE: ICD-10-CM

## 2022-02-09 NOTE — TELEPHONE ENCOUNTER
No new care gaps identified.  Powered by [employer]. Reference number: [id number].   2/09/2022 10:28:37 AM CST

## 2022-02-11 RX ORDER — LEVOTHYROXINE SODIUM 75 UG/1
37.5 TABLET ORAL
Qty: 45 TABLET | Refills: 3 | Status: SHIPPED | OUTPATIENT
Start: 2022-02-11 | End: 2023-03-13

## 2022-02-23 ENCOUNTER — LAB VISIT (OUTPATIENT)
Dept: LAB | Facility: HOSPITAL | Age: 77
End: 2022-02-23
Attending: INTERNAL MEDICINE
Payer: MEDICARE

## 2022-02-23 DIAGNOSIS — M85.859 OSTEOPENIA OF THIGH, UNSPECIFIED LATERALITY: ICD-10-CM

## 2022-02-23 DIAGNOSIS — E78.2 MIXED HYPERLIPIDEMIA: ICD-10-CM

## 2022-02-23 DIAGNOSIS — R73.01 IMPAIRED FASTING GLUCOSE: ICD-10-CM

## 2022-02-23 DIAGNOSIS — E55.9 VITAMIN D INSUFFICIENCY: ICD-10-CM

## 2022-02-23 DIAGNOSIS — E03.9 HYPOTHYROIDISM, UNSPECIFIED TYPE: ICD-10-CM

## 2022-02-23 DIAGNOSIS — Z01.89 ENCOUNTER FOR LABORATORY TEST: ICD-10-CM

## 2022-02-23 LAB
ALBUMIN SERPL BCP-MCNC: 4.1 G/DL (ref 3.5–5.2)
ALP SERPL-CCNC: 71 U/L (ref 55–135)
ALT SERPL W/O P-5'-P-CCNC: 23 U/L (ref 10–44)
ANION GAP SERPL CALC-SCNC: 9 MMOL/L (ref 8–16)
AST SERPL-CCNC: 22 U/L (ref 10–40)
BILIRUB SERPL-MCNC: 0.4 MG/DL (ref 0.1–1)
BUN SERPL-MCNC: 16 MG/DL (ref 8–23)
CALCIUM SERPL-MCNC: 9.8 MG/DL (ref 8.7–10.5)
CHLORIDE SERPL-SCNC: 103 MMOL/L (ref 95–110)
CHOLEST SERPL-MCNC: 186 MG/DL (ref 120–199)
CHOLEST/HDLC SERPL: 3.3 {RATIO} (ref 2–5)
CO2 SERPL-SCNC: 28 MMOL/L (ref 23–29)
CREAT SERPL-MCNC: 0.8 MG/DL (ref 0.5–1.4)
EST. GFR  (AFRICAN AMERICAN): >60 ML/MIN/1.73 M^2
EST. GFR  (NON AFRICAN AMERICAN): >60 ML/MIN/1.73 M^2
ESTIMATED AVG GLUCOSE: 105 MG/DL (ref 68–131)
GLUCOSE SERPL-MCNC: 100 MG/DL (ref 70–110)
HBA1C MFR BLD: 5.3 % (ref 4–5.6)
HDLC SERPL-MCNC: 57 MG/DL (ref 40–75)
HDLC SERPL: 30.6 % (ref 20–50)
LDLC SERPL CALC-MCNC: 110.2 MG/DL (ref 63–159)
MAGNESIUM SERPL-MCNC: 1.9 MG/DL (ref 1.6–2.6)
NONHDLC SERPL-MCNC: 129 MG/DL
POTASSIUM SERPL-SCNC: 4.6 MMOL/L (ref 3.5–5.1)
PROT SERPL-MCNC: 7.4 G/DL (ref 6–8.4)
SODIUM SERPL-SCNC: 140 MMOL/L (ref 136–145)
T3FREE SERPL-MCNC: 2.4 PG/ML (ref 2.3–4.2)
T4 FREE SERPL-MCNC: 0.79 NG/DL (ref 0.71–1.51)
TRIGL SERPL-MCNC: 94 MG/DL (ref 30–150)
TSH SERPL DL<=0.005 MIU/L-ACNC: 1.46 UIU/ML (ref 0.4–4)

## 2022-02-23 PROCEDURE — 83036 HEMOGLOBIN GLYCOSYLATED A1C: CPT | Performed by: INTERNAL MEDICINE

## 2022-02-23 PROCEDURE — 83735 ASSAY OF MAGNESIUM: CPT | Performed by: INTERNAL MEDICINE

## 2022-02-23 PROCEDURE — 36415 COLL VENOUS BLD VENIPUNCTURE: CPT | Mod: PN | Performed by: INTERNAL MEDICINE

## 2022-02-23 PROCEDURE — 84481 FREE ASSAY (FT-3): CPT | Performed by: INTERNAL MEDICINE

## 2022-02-23 PROCEDURE — 84439 ASSAY OF FREE THYROXINE: CPT | Performed by: INTERNAL MEDICINE

## 2022-02-23 PROCEDURE — 80061 LIPID PANEL: CPT | Performed by: INTERNAL MEDICINE

## 2022-02-23 PROCEDURE — 80053 COMPREHEN METABOLIC PANEL: CPT | Performed by: INTERNAL MEDICINE

## 2022-02-23 PROCEDURE — 84443 ASSAY THYROID STIM HORMONE: CPT | Performed by: INTERNAL MEDICINE

## 2022-02-28 ENCOUNTER — OFFICE VISIT (OUTPATIENT)
Dept: FAMILY MEDICINE | Facility: CLINIC | Age: 77
End: 2022-02-28
Payer: MEDICARE

## 2022-02-28 VITALS
HEART RATE: 83 BPM | SYSTOLIC BLOOD PRESSURE: 140 MMHG | OXYGEN SATURATION: 98 % | HEIGHT: 64 IN | DIASTOLIC BLOOD PRESSURE: 76 MMHG | WEIGHT: 129.94 LBS | BODY MASS INDEX: 22.18 KG/M2

## 2022-02-28 DIAGNOSIS — E78.2 MIXED HYPERLIPIDEMIA: Primary | ICD-10-CM

## 2022-02-28 DIAGNOSIS — Z01.89 ENCOUNTER FOR LABORATORY TEST: ICD-10-CM

## 2022-02-28 DIAGNOSIS — E03.9 HYPOTHYROIDISM, UNSPECIFIED TYPE: ICD-10-CM

## 2022-02-28 DIAGNOSIS — E55.9 VITAMIN D INSUFFICIENCY: ICD-10-CM

## 2022-02-28 DIAGNOSIS — J30.2 SEASONAL ALLERGIES: ICD-10-CM

## 2022-02-28 DIAGNOSIS — R73.01 IMPAIRED FASTING GLUCOSE: ICD-10-CM

## 2022-02-28 DIAGNOSIS — M85.859 OSTEOPENIA OF THIGH, UNSPECIFIED LATERALITY: ICD-10-CM

## 2022-02-28 DIAGNOSIS — E53.8 B12 DEFICIENCY: ICD-10-CM

## 2022-02-28 PROCEDURE — 99214 PR OFFICE/OUTPT VISIT, EST, LEVL IV, 30-39 MIN: ICD-10-PCS | Mod: S$PBB,,, | Performed by: INTERNAL MEDICINE

## 2022-02-28 PROCEDURE — 99214 OFFICE O/P EST MOD 30 MIN: CPT | Mod: S$PBB,,, | Performed by: INTERNAL MEDICINE

## 2022-02-28 PROCEDURE — 99999 PR PBB SHADOW E&M-EST. PATIENT-LVL IV: ICD-10-PCS | Mod: PBBFAC,,, | Performed by: INTERNAL MEDICINE

## 2022-02-28 PROCEDURE — 99214 OFFICE O/P EST MOD 30 MIN: CPT | Mod: PBBFAC,PN | Performed by: INTERNAL MEDICINE

## 2022-02-28 PROCEDURE — 99999 PR PBB SHADOW E&M-EST. PATIENT-LVL IV: CPT | Mod: PBBFAC,,, | Performed by: INTERNAL MEDICINE

## 2022-02-28 NOTE — PATIENT INSTRUCTIONS
Mixed hyperlipidemia: Maintain low fat high fiber diet, exercise regularly. Weight reduction where indicated. Cont pravastatin at 80 mg a night. Add metamucil daily. Oatmeal w raisins for breakfast lso will help.   -     Hemoglobin A1C; Future; Expected date: 02/28/2022  -     Lipid Panel; Future; Expected date: 02/28/2022    Hypothyroidism, unspecified type; TSH 1.45; same levothyroxine dosing.     Impaired fasting glucose; Exercise recommended with weight reduction and low carb diet; we'll follow hemoglobin A1c's with you periodically.  -     Hemoglobin A1C; Future; Expected date: 02/28/2022  -     Basic Metabolic Panel; Future; Expected date: 02/28/2022    Osteopenia of thigh, unspecified laterality; DEXA after 2/24/23; Weight bearing exercises, continue calcium and vit D supplements. DEXA scabn at 2 yr intervals as needed.    Vitamin D insufficiency; same osing; checking Vit D level needs to wait as shortage of blood tubes    Seasonal allergies; flonase helps; can add claritin if needed.     Encounter for laboratory test; reviewed w pt and ordered for f/u.     B12 deficiency; B12 at 500 mcg a day; check level w f/u.   -     CBC Auto Differential; Future; Expected date: 02/28/2022  -     Vitamin B12; Future; Expected date: 02/28/2022

## 2022-02-28 NOTE — PROGRESS NOTES
Subjective:       Patient ID: Tiffanie Black is a 76 y.o. female.    Chief Complaint: Follow-up    HPI:  Here today for reassessment and go over lab work.  Mixed hyperlipidemia: Maintain low fat high fiber diet, exercise regularly. Weight reduction where indicated. Cont pravastatin at 80 mg a night. Add metamucil daily. Oatmeal w raisins for breakfast lso will help.  Lipid profile:  Cholesterol 186/triglyceride 94/HDL 57/ on pravastatin 80 mg per night add Metamucil daily and oatmeal with raisins for breakfast  -     Hemoglobin A1C; Future; Expected date: 02/28/2022  -     Lipid Panel; Future; Expected date: 02/28/2022  Hypothyroidism, unspecified type; TSH 1.45; same levothyroxine dosing.   Impaired fasting glucose; Exercise recommended with weight reduction and low carb diet; we'll follow hemoglobin A1c's with you periodically.  -     Hemoglobin A1C; Future; Expected date: 02/28/2022  -     Basic Metabolic Panel; Future; Expected date: 02/28/2022  Osteopenia of thigh, unspecified laterality; DEXA after 2/24/23; Weight bearing exercises, continue calcium and vit D supplements. DEXA scabn at 2 yr intervals as needed.  02/24/2021 DEXA scan reviewed with patient femoral bone T-score -1.4 unchanged.  Vitamin D3 2 and IU per day; calcium 600-D extended release 1200 mg daily  Vitamin D insufficiency; same dosing; checking Vit D level needs to wait as shortage of blood tubes  Seasonal allergies; flonase helps; can add claritin if needed.   Encounter for laboratory test; reviewed w pt and ordered for f/u.   B12 deficiency; B12 at 500 mcg a day; check level w f/u.   -     CBC Auto Differential; Future; Expected date: 02/28/2022  -     Vitamin B12; Future; Expected date: 02/28/2022  Total time 11:00 a.m. through 11:31 a.m..  Greater than 50% of time spent in discussion, counseling, and review.  Medications reviewed and addressed.  Different diagnosis is were discussed as well as plan of care      Review of  "Systems   Constitutional: Negative for appetite change and fever.   HENT: Negative for congestion, postnasal drip, rhinorrhea and sinus pressure.    Eyes: Negative for discharge and itching.   Respiratory: Negative for cough, chest tightness, shortness of breath and wheezing.    Cardiovascular: Negative for chest pain, palpitations and leg swelling.   Gastrointestinal: Negative for abdominal distention, abdominal pain, blood in stool, constipation, diarrhea, nausea and vomiting.   Endocrine: Negative for polydipsia, polyphagia and polyuria.   Genitourinary: Negative for dysuria and hematuria.   Musculoskeletal: Negative for arthralgias and myalgias.   Skin: Negative for rash.   Allergic/Immunologic: Negative for environmental allergies and food allergies.   Neurological: Negative for tremors, seizures and syncope.   Hematological: Negative for adenopathy. Does not bruise/bleed easily.   Psychiatric/Behavioral: Negative for dysphoric mood. The patient is not nervous/anxious.       Objective:        Vitals:    02/28/22 0910   BP: (!) 140/76   Pulse: 83   SpO2: 98%   Weight: 59 kg (129 lb 15.4 oz)   Height: 5' 4" (1.626 m)       BMI Readings from Last 3 Encounters:   02/28/22 22.31 kg/m²   08/27/21 21.91 kg/m²   04/21/21 22.16 kg/m²        Wt Readings from Last 3 Encounters:   02/28/22 0910 59 kg (129 lb 15.4 oz)   08/27/21 0919 57.9 kg (127 lb 10.3 oz)   04/21/21 0942 58.6 kg (129 lb 1.3 oz)        BP Readings from Last 3 Encounters:   02/28/22 (!) 140/76   08/27/21 130/88   04/21/21 124/84        There are no preventive care reminders to display for this patient.     There are no preventive care reminders to display for this patient.      Past Medical History:   Diagnosis Date    Cancer 2008    left breast    Chemotherapy induced neutropenia     Hypothyroidism     Impaired fasting glucose     Mixed hyperlipidemia     Osteopenia     Pre-hypertension 4/27/2017    Seasonal allergies     Vitamin B12 deficiency  "    Vitamin D insufficiency        Past Surgical History:   Procedure Laterality Date    Bilateral mastectomy      BLADDER SUSPENSION  08/2012    Done same time as her Hysterectomy w BSO; Dr Smith her Gyn.     COLONOSCOPY  07/29/2015    Dr Scott; divert ds descending/sigmoid ; 5 yr f/u;.2020 due.    HYSTERECTOMY  08/2012    Robotic; w BSO; Dr Smith Gyn did her surgery. Bladder suspension done at same time.     JOINT REPLACEMENT Right 08/2015    Right hip total replaced. Dr Jaleesa kothari did her surgery       Social History     Tobacco Use    Smoking status: Former Smoker     Types: Cigarettes    Smokeless tobacco: Never Used   Substance Use Topics    Alcohol use: Yes     Alcohol/week: 2.0 - 3.0 standard drinks     Types: 2 - 3 Glasses of wine per week     Comment: nightly    Drug use: No       No family history on file.    Review of patient's allergies indicates:   Allergen Reactions    Cleocin [clindamycin hcl] Itching and Rash    Bactrim [sulfamethoxazole-trimethoprim] Hives    Biaxin [clarithromycin] Hives       Current Outpatient Medications on File Prior to Visit   Medication Sig Dispense Refill    calcium carbonate/vitamin D3 (CALCIUM 600 + D,3, ORAL) Take 1 tablet by mouth once daily. TAKES CALCIUM 1200MG +D3 1000IU TABLET DAILY      cholecalciferol, vitamin D3, 2,000 unit Tab Take 2,000 mg by mouth once daily.      clobetasol (OLUX) 0.05 % Foam APPLY TO AFFECTED AREA on scalp DAILY AS NEEDED  11    cyanocobalamin 500 MCG tablet Take 500 mcg by mouth once daily.      fluticasone propionate (FLONASE) 50 mcg/actuation nasal spray 1 spray by Each Nare route once daily.      ketoconazole (NIZORAL) 2 % shampoo apply to scalp leave on for 5 minutes then rinse 3times a week  11    levothyroxine (SYNTHROID) 75 MCG tablet Take 0.5 tablets (37.5 mcg total) by mouth before breakfast. 45 tablet 3    pravastatin (PRAVACHOL) 80 MG tablet Take 1 tablet (80 mg total) by mouth every evening. 90 tablet 1     hydrocortisone (ANUSOL-HC) 2.5 % rectal cream Apply rectally 2-3x a day as needed for hemorrhoids. (Patient not taking: Reported on 2/28/2022) 28 g 1    hyoscyamine (ANASPAZ,LEVSIN) 0.125 mg Tab Take 1 tablet (125 mcg total) by mouth every 6 (six) hours as needed (abdominal cramping.). (Patient not taking: Reported on 8/27/2021) 30 tablet 1    pantoprazole (PROTONIX) 40 MG tablet Take 1 tablet (40 mg total) by mouth once daily. (Patient not taking: No sig reported) 90 tablet 0    valACYclovir (VALTREX) 1000 MG tablet Take 1 tablet (1,000 mg total) by mouth 3 (three) times daily. for 7 days (Patient not taking: Reported on 2/28/2022) 21 tablet 0    zolpidem (AMBIEN) 10 mg Tab Take 1 tablet (10 mg total) by mouth nightly as needed. (Patient not taking: Reported on 2/28/2022) 30 tablet 3     No current facility-administered medications on file prior to visit.       Physical Exam  Vitals reviewed.   Constitutional:       Appearance: She is well-developed.   HENT:      Head: Normocephalic and atraumatic.   Neck:      Thyroid: No thyromegaly.      Vascular: No carotid bruit.   Cardiovascular:      Rate and Rhythm: Normal rate and regular rhythm.      Heart sounds: Normal heart sounds. No murmur heard.    No gallop.   Pulmonary:      Effort: Pulmonary effort is normal. No respiratory distress.      Breath sounds: Normal breath sounds. No wheezing or rales.   Abdominal:      General: Bowel sounds are normal. There is no distension.      Palpations: Abdomen is soft.      Tenderness: There is no abdominal tenderness. There is no guarding or rebound.   Musculoskeletal:         General: Normal range of motion.      Cervical back: Normal range of motion and neck supple.      Right lower leg: No edema.      Left lower leg: No edema.   Lymphadenopathy:      Cervical: No cervical adenopathy.   Skin:     Findings: No rash.   Neurological:      Mental Status: She is alert and oriented to person, place, and time.       Comments: Moves all 4 extremities fine.   Psychiatric:         Behavior: Behavior normal.         Thought Content: Thought content normal.         Assessment:       1. Mixed hyperlipidemia    2. Hypothyroidism, unspecified type    3. Impaired fasting glucose    4. Osteopenia of thigh, unspecified laterality    5. Vitamin D insufficiency    6. Seasonal allergies    7. Encounter for laboratory test    8. B12 deficiency        Plan:       Mixed hyperlipidemia: Maintain low fat high fiber diet, exercise regularly. Weight reduction where indicated. Cont pravastatin at 80 mg a night. Add metamucil daily. Oatmeal w raisins for breakfast lso will help.   -     Hemoglobin A1C; Future; Expected date: 02/28/2022  -     Lipid Panel; Future; Expected date: 02/28/2022    Hypothyroidism, unspecified type; TSH 1.45; same levothyroxine dosing.     Impaired fasting glucose; Exercise recommended with weight reduction and low carb diet; we'll follow hemoglobin A1c's with you periodically.  -     Hemoglobin A1C; Future; Expected date: 02/28/2022  -     Basic Metabolic Panel; Future; Expected date: 02/28/2022    Osteopenia of thigh, unspecified laterality; DEXA after 2/24/23; Weight bearing exercises, continue calcium and vit D supplements. DEXA scabn at 2 yr intervals as needed.    Vitamin D insufficiency; same osing; checking Vit D level needs to wait as shortage of blood tubes    Seasonal allergies; flonase helps; can add claritin if needed.     Encounter for laboratory test; reviewed w pt and ordered for f/u.     B12 deficiency; B12 at 500 mcg a day; check level w f/u.   -     CBC Auto Differential; Future; Expected date: 02/28/2022  -     Vitamin B12; Future; Expected date: 02/28/2022

## 2022-03-31 ENCOUNTER — TELEPHONE (OUTPATIENT)
Dept: FAMILY MEDICINE | Facility: CLINIC | Age: 77
End: 2022-03-31
Payer: MEDICARE

## 2022-03-31 NOTE — TELEPHONE ENCOUNTER
----- Message from Shireen Case sent at 3/31/2022 12:38 PM CDT -----  Contact: Pt at [phone]  Type:  Same Day Appointment Request    Caller is requesting a same day appointment.  Caller declined first available appointment listed below.      Name of Caller:  Pt  When is the first available appointment?  4/1  Symptoms:  Fever chills headache  Best Call Back Number:  [phone]    Additional Information: Pt has been sick for 5 days. Pls call back and advise.

## 2022-04-01 ENCOUNTER — OFFICE VISIT (OUTPATIENT)
Dept: URGENT CARE | Facility: CLINIC | Age: 77
End: 2022-04-01
Payer: MEDICARE

## 2022-04-01 VITALS
WEIGHT: 129 LBS | HEART RATE: 106 BPM | TEMPERATURE: 99 F | SYSTOLIC BLOOD PRESSURE: 130 MMHG | HEIGHT: 64 IN | DIASTOLIC BLOOD PRESSURE: 76 MMHG | OXYGEN SATURATION: 96 % | BODY MASS INDEX: 22.02 KG/M2

## 2022-04-01 DIAGNOSIS — B34.9 VIRAL SYNDROME: Primary | ICD-10-CM

## 2022-04-01 DIAGNOSIS — R50.9 FEVER, UNSPECIFIED FEVER CAUSE: ICD-10-CM

## 2022-04-01 DIAGNOSIS — R51.9 INTRACTABLE HEADACHE, UNSPECIFIED CHRONICITY PATTERN, UNSPECIFIED HEADACHE TYPE: ICD-10-CM

## 2022-04-01 DIAGNOSIS — R52 BODY ACHES: ICD-10-CM

## 2022-04-01 LAB
CTP QC/QA: YES
CTP QC/QA: YES
POC MOLECULAR INFLUENZA A AGN: NEGATIVE
POC MOLECULAR INFLUENZA B AGN: NEGATIVE
SARS-COV-2 RDRP RESP QL NAA+PROBE: NEGATIVE

## 2022-04-01 PROCEDURE — 99213 OFFICE O/P EST LOW 20 MIN: CPT | Mod: S$GLB,,, | Performed by: PHYSICIAN ASSISTANT

## 2022-04-01 PROCEDURE — 99213 PR OFFICE/OUTPT VISIT, EST, LEVL III, 20-29 MIN: ICD-10-PCS | Mod: S$GLB,,, | Performed by: PHYSICIAN ASSISTANT

## 2022-04-01 PROCEDURE — U0002 COVID-19 LAB TEST NON-CDC: HCPCS | Mod: QW,CR,S$GLB, | Performed by: PHYSICIAN ASSISTANT

## 2022-04-01 PROCEDURE — 87502 POCT INFLUENZA A/B MOLECULAR: ICD-10-PCS | Mod: QW,S$GLB,, | Performed by: PHYSICIAN ASSISTANT

## 2022-04-01 PROCEDURE — U0002: ICD-10-PCS | Mod: QW,CR,S$GLB, | Performed by: PHYSICIAN ASSISTANT

## 2022-04-01 PROCEDURE — 87502 INFLUENZA DNA AMP PROBE: CPT | Mod: QW,S$GLB,, | Performed by: PHYSICIAN ASSISTANT

## 2022-04-01 NOTE — PROGRESS NOTES
"Subjective:       Patient ID: Tiffanie Black is a 76 y.o. female.    Vitals:  height is 5' 4" (1.626 m) and weight is 58.5 kg (129 lb). Her temperature is 99.1 °F (37.3 °C). Her blood pressure is 130/76 and her pulse is 106. Her oxygen saturation is 96%.     Chief Complaint: Fever (I have a fever, headache, dizziness, - Entered by patient)    Patient is a 76-year-old female with a PMH of hyperlipidemia, malignant neoplasm of the left breast, hypothyroidism, B12 and D deficiencies, who presents to urgent care today for evaluation a headaches, fever, body aches, and dizziness.  Her headaches and body aches started approximately 5 days ago.  She has been feeling feverish however did not measure her temperature until yesterday evening.  T-max 103° F. Patient did take Ibuprofen and Tylenol with mild relief.  She states that last night she sat up in bed and became dizzy however the sensation resolved and she has not experienced any dizziness since.  She denies any chest pain, palpitations, shortness of breath, hemoptysis, syncope, or weakness on one side of her body.  She denies any URI symptoms including congestion or cough.  She denies any GI upset or abdominal pain.  Patient has not had any known exposure to COVID or flu recently.     Other  This is a new problem. The current episode started in the past 7 days. The problem occurs constantly. The problem has been gradually worsening. Associated symptoms include chills, a fever, headaches and myalgias. Pertinent negatives include no abdominal pain, chest pain, congestion, coughing, nausea, rash, sore throat or vomiting. Treatments tried: OTC meds. The treatment provided mild relief.       Constitution: Positive for chills and fever.   HENT: Negative for congestion, postnasal drip and sore throat.    Cardiovascular: Negative for chest pain.   Respiratory: Negative for cough and shortness of breath.    Gastrointestinal: Negative for abdominal pain, nausea, vomiting, " constipation and diarrhea.   Musculoskeletal: Positive for muscle ache.   Skin: Negative for rash.   Neurological: Positive for dizziness and headaches.       Objective:      Physical Exam   Constitutional: She is oriented to person, place, and time. She appears well-developed. She is cooperative.  Non-toxic appearance. She does not appear ill. No distress.   HENT:   Head: Normocephalic and atraumatic.   Ears:   Right Ear: Hearing, tympanic membrane, external ear and ear canal normal. Tympanic membrane is not injected, not erythematous and not bulging. No middle ear effusion. impacted cerumen  Left Ear: Hearing, tympanic membrane, external ear and ear canal normal. Tympanic membrane is not injected, not erythematous and not bulging.  No middle ear effusion. impacted cerumen  Nose: Nose normal. No mucosal edema, rhinorrhea, nasal deformity or congestion. No epistaxis. Right sinus exhibits no maxillary sinus tenderness and no frontal sinus tenderness. Left sinus exhibits no maxillary sinus tenderness and no frontal sinus tenderness.   Mouth/Throat: Uvula is midline, oropharynx is clear and moist and mucous membranes are normal. No trismus in the jaw. Normal dentition. No uvula swelling. No oropharyngeal exudate, posterior oropharyngeal edema, posterior oropharyngeal erythema, tonsillar abscesses or cobblestoning.   Eyes: Conjunctivae and lids are normal. Right eye exhibits no discharge. Left eye exhibits no discharge. No scleral icterus.   Neck: Trachea normal and phonation normal. Neck supple. No edema present. No erythema present. No neck rigidity present.   Cardiovascular: Normal rate, regular rhythm, normal heart sounds and normal pulses.   No murmur heard.Exam reveals no gallop and no friction rub.   Pulmonary/Chest: Effort normal and breath sounds normal. No stridor. No respiratory distress. She has no decreased breath sounds. She has no wheezes. She has no rhonchi. She has no rales.   Abdominal: Normal  appearance.   Musculoskeletal: Normal range of motion.         General: No deformity. Normal range of motion.   Lymphadenopathy:        Head (right side): No submandibular and no tonsillar adenopathy present.        Head (left side): No submandibular and no tonsillar adenopathy present.     She has no cervical adenopathy.        Right cervical: No superficial cervical and no posterior cervical adenopathy present.       Left cervical: No superficial cervical and no posterior cervical adenopathy present.   Neurological: She is alert and oriented to person, place, and time. She exhibits normal muscle tone. Coordination normal.   Skin: Skin is warm, dry, intact, not diaphoretic and not pale.   Psychiatric: Her speech is normal and behavior is normal. Judgment and thought content normal.   Nursing note and vitals reviewed.        Results for orders placed or performed in visit on 04/01/22   POCT Influenza A/B MOLECULAR   Result Value Ref Range    POC Molecular Influenza A Ag Negative Negative, Not Reported    POC Molecular Influenza B Ag Negative Negative, Not Reported     Acceptable Yes    POCT COVID-19 Rapid Screening   Result Value Ref Range    POC Rapid COVID Negative Negative     Acceptable Yes        Assessment:       1. Viral syndrome    2. Fever, unspecified fever cause    3. Body aches    4. Intractable headache, unspecified chronicity pattern, unspecified headache type        Plan:     COVID and flu testing are both negative at this time and reviewed results with patient.  Attempted to collect a urine specimen to do urinalysis for further evaluation of fever however patient is unable to void.  Order cancelled at this time.  She denies any dysuria, frequency or urgency.  Discussed with patient the limitations of workup in the urgent care setting.  Should she have persistent fever or any new/worsening symptoms, follow up with your family provider.  Discussed that she may continue  alternating between Tylenol and ibuprofen every 4 hours as needed for pain, headache, body aches, or fever greater than 100.4.  Rest and fluids. Patient verbalized understanding and all of their questions were answered.       Viral syndrome    Fever, unspecified fever cause  -     POCT Influenza A/B MOLECULAR  -     POCT COVID-19 Rapid Screening  -     Cancel: POCT Urinalysis, Dipstick, Automated, W/O Scope    Body aches    Intractable headache, unspecified chronicity pattern, unspecified headache type      Patient Instructions     Patient Instructions   -Below are suggestions for symptomatic relief:              -Tylenol every 4 hours OR ibuprofen every 6 hours as needed for pain/fever.              -Salt water gargles to soothe throat pain.              -Chloroseptic spray also helps to numb throat pain.              -Nasal saline spray reduces inflammation and dryness.              -Warm face compresses to help with facial sinus pain/pressure.              -Vicks vapor rub at night.              -Flonase OTC or Nasacort OTC for nasal congestion.              -Simple foods like chicken noodle soup.              -Delsym helps with coughing at night              -Zyrtec/Claritin during the day & Benadryl at night may help with allergies.                If you DO NOT have Hypertension or any history of palpitations, it is ok to take over the counter Sudafed or Mucinex D or Allegra-D or Claritin-D or Zyrtec-D.  If you do take one of the above, it is ok to combine that with plain over the counter Mucinex or Allegra or Claritin or Zyrtec. If, for example, you are taking Zyrtec -D, you can combine that with Mucinex, but not Mucinex-D.  If you are taking Mucinex-D, you can combine that with plain Allegra or Claritin or Zyrtec.   If you DO have Hypertension or palpitations, it is safe to take Coricidin HBP for relief of sinus symptoms.      Please follow up with your Primary care provider within 2-5 days if your signs and  symptoms have not resolved or worsen.     If your condition worsens or fails to improve we recommend that you receive another evaluation at the emergency room immediately or contact your primary medical clinic to discuss your concerns.   You must understand that you have received an Urgent Care treatment only and that you may be released before all of your medical problems are known or treated. You, the patient, will arrange for follow up care as instructed.     RED FLAGS/WARNING SYMPTOMS DISCUSSED WITH PATIENT THAT WOULD WARRANT EMERGENT MEDICAL ATTENTION. PATIENT VERBALIZED UNDERSTANDING.

## 2022-04-01 NOTE — PATIENT INSTRUCTIONS
Patient Instructions   -Below are suggestions for symptomatic relief:              -Tylenol every 4 hours OR ibuprofen every 6 hours as needed for pain/fever.              -Salt water gargles to soothe throat pain.              -Chloroseptic spray also helps to numb throat pain.              -Nasal saline spray reduces inflammation and dryness.              -Warm face compresses to help with facial sinus pain/pressure.              -Vicks vapor rub at night.              -Flonase OTC or Nasacort OTC for nasal congestion.              -Simple foods like chicken noodle soup.              -Delsym helps with coughing at night              -Zyrtec/Claritin during the day & Benadryl at night may help with allergies.                If you DO NOT have Hypertension or any history of palpitations, it is ok to take over the counter Sudafed or Mucinex D or Allegra-D or Claritin-D or Zyrtec-D.  If you do take one of the above, it is ok to combine that with plain over the counter Mucinex or Allegra or Claritin or Zyrtec. If, for example, you are taking Zyrtec -D, you can combine that with Mucinex, but not Mucinex-D.  If you are taking Mucinex-D, you can combine that with plain Allegra or Claritin or Zyrtec.   If you DO have Hypertension or palpitations, it is safe to take Coricidin HBP for relief of sinus symptoms.      Please follow up with your Primary care provider within 2-5 days if your signs and symptoms have not resolved or worsen.     If your condition worsens or fails to improve we recommend that you receive another evaluation at the emergency room immediately or contact your primary medical clinic to discuss your concerns.   You must understand that you have received an Urgent Care treatment only and that you may be released before all of your medical problems are known or treated. You, the patient, will arrange for follow up care as instructed.     RED FLAGS/WARNING SYMPTOMS DISCUSSED WITH PATIENT THAT WOULD WARRANT  EMERGENT MEDICAL ATTENTION. PATIENT VERBALIZED UNDERSTANDING.

## 2022-04-04 ENCOUNTER — TELEPHONE (OUTPATIENT)
Dept: URGENT CARE | Facility: CLINIC | Age: 77
End: 2022-04-04
Payer: MEDICARE

## 2022-06-30 ENCOUNTER — LAB VISIT (OUTPATIENT)
Dept: LAB | Facility: CLINIC | Age: 77
End: 2022-06-30
Attending: INTERNAL MEDICINE
Payer: MEDICARE

## 2022-06-30 DIAGNOSIS — E53.8 B12 DEFICIENCY: ICD-10-CM

## 2022-06-30 DIAGNOSIS — R73.01 IMPAIRED FASTING GLUCOSE: ICD-10-CM

## 2022-06-30 DIAGNOSIS — E78.2 MIXED HYPERLIPIDEMIA: ICD-10-CM

## 2022-06-30 LAB
ANION GAP SERPL CALC-SCNC: 5 MMOL/L (ref 8–16)
BASOPHILS # BLD AUTO: 0.03 K/UL (ref 0–0.2)
BASOPHILS NFR BLD: 0.8 % (ref 0–1.9)
BUN SERPL-MCNC: 9 MG/DL (ref 8–23)
CALCIUM SERPL-MCNC: 9.3 MG/DL (ref 8.7–10.5)
CHLORIDE SERPL-SCNC: 106 MMOL/L (ref 95–110)
CHOLEST SERPL-MCNC: 170 MG/DL (ref 120–199)
CHOLEST/HDLC SERPL: 2.8 {RATIO} (ref 2–5)
CO2 SERPL-SCNC: 31 MMOL/L (ref 23–29)
CREAT SERPL-MCNC: 0.7 MG/DL (ref 0.5–1.4)
DIFFERENTIAL METHOD: ABNORMAL
EOSINOPHIL # BLD AUTO: 0.1 K/UL (ref 0–0.5)
EOSINOPHIL NFR BLD: 3.3 % (ref 0–8)
ERYTHROCYTE [DISTWIDTH] IN BLOOD BY AUTOMATED COUNT: 13.4 % (ref 11.5–14.5)
EST. GFR  (AFRICAN AMERICAN): >60 ML/MIN/1.73 M^2
EST. GFR  (NON AFRICAN AMERICAN): >60 ML/MIN/1.73 M^2
ESTIMATED AVG GLUCOSE: 100 MG/DL (ref 68–131)
GLUCOSE SERPL-MCNC: 93 MG/DL (ref 70–110)
HBA1C MFR BLD: 5.1 % (ref 4–5.6)
HCT VFR BLD AUTO: 45.1 % (ref 37–48.5)
HDLC SERPL-MCNC: 60 MG/DL (ref 40–75)
HDLC SERPL: 35.3 % (ref 20–50)
HGB BLD-MCNC: 13.9 G/DL (ref 12–16)
IMM GRANULOCYTES # BLD AUTO: 0.01 K/UL (ref 0–0.04)
IMM GRANULOCYTES NFR BLD AUTO: 0.3 % (ref 0–0.5)
LDLC SERPL CALC-MCNC: 92.6 MG/DL (ref 63–159)
LYMPHOCYTES # BLD AUTO: 1.3 K/UL (ref 1–4.8)
LYMPHOCYTES NFR BLD: 35.9 % (ref 18–48)
MCH RBC QN AUTO: 29.9 PG (ref 27–31)
MCHC RBC AUTO-ENTMCNC: 30.8 G/DL (ref 32–36)
MCV RBC AUTO: 97 FL (ref 82–98)
MONOCYTES # BLD AUTO: 0.5 K/UL (ref 0.3–1)
MONOCYTES NFR BLD: 13.9 % (ref 4–15)
NEUTROPHILS # BLD AUTO: 1.6 K/UL (ref 1.8–7.7)
NEUTROPHILS NFR BLD: 45.8 % (ref 38–73)
NONHDLC SERPL-MCNC: 110 MG/DL
NRBC BLD-RTO: 0 /100 WBC
PLATELET # BLD AUTO: 202 K/UL (ref 150–450)
PMV BLD AUTO: 10.6 FL (ref 9.2–12.9)
POTASSIUM SERPL-SCNC: 4 MMOL/L (ref 3.5–5.1)
RBC # BLD AUTO: 4.65 M/UL (ref 4–5.4)
SODIUM SERPL-SCNC: 142 MMOL/L (ref 136–145)
TRIGL SERPL-MCNC: 87 MG/DL (ref 30–150)
VIT B12 SERPL-MCNC: 502 PG/ML (ref 210–950)
WBC # BLD AUTO: 3.59 K/UL (ref 3.9–12.7)

## 2022-06-30 PROCEDURE — 36415 PR COLLECTION VENOUS BLOOD,VENIPUNCTURE: ICD-10-PCS | Mod: ,,, | Performed by: INTERNAL MEDICINE

## 2022-06-30 PROCEDURE — 83036 HEMOGLOBIN GLYCOSYLATED A1C: CPT | Performed by: INTERNAL MEDICINE

## 2022-06-30 PROCEDURE — 80061 LIPID PANEL: CPT | Performed by: INTERNAL MEDICINE

## 2022-06-30 PROCEDURE — 82607 VITAMIN B-12: CPT | Performed by: INTERNAL MEDICINE

## 2022-06-30 PROCEDURE — 85025 COMPLETE CBC W/AUTO DIFF WBC: CPT | Performed by: INTERNAL MEDICINE

## 2022-06-30 PROCEDURE — 36415 COLL VENOUS BLD VENIPUNCTURE: CPT | Mod: ,,, | Performed by: INTERNAL MEDICINE

## 2022-06-30 PROCEDURE — 80048 BASIC METABOLIC PNL TOTAL CA: CPT | Performed by: INTERNAL MEDICINE

## 2022-07-05 ENCOUNTER — OFFICE VISIT (OUTPATIENT)
Dept: FAMILY MEDICINE | Facility: CLINIC | Age: 77
End: 2022-07-05
Payer: MEDICARE

## 2022-07-05 VITALS
HEIGHT: 64 IN | RESPIRATION RATE: 17 BRPM | SYSTOLIC BLOOD PRESSURE: 124 MMHG | HEART RATE: 66 BPM | WEIGHT: 121.56 LBS | BODY MASS INDEX: 20.75 KG/M2 | DIASTOLIC BLOOD PRESSURE: 78 MMHG

## 2022-07-05 DIAGNOSIS — C50.812 MALIGNANT NEOPLASM OF OVERLAPPING SITES OF LEFT BREAST IN FEMALE, ESTROGEN RECEPTOR POSITIVE: ICD-10-CM

## 2022-07-05 DIAGNOSIS — R73.01 IMPAIRED FASTING GLUCOSE: ICD-10-CM

## 2022-07-05 DIAGNOSIS — E78.2 MIXED HYPERLIPIDEMIA: Primary | ICD-10-CM

## 2022-07-05 DIAGNOSIS — D70.9 NEUTROPENIA, UNSPECIFIED TYPE: ICD-10-CM

## 2022-07-05 DIAGNOSIS — Z01.89 ENCOUNTER FOR LABORATORY TEST: ICD-10-CM

## 2022-07-05 DIAGNOSIS — E53.8 VITAMIN B12 DEFICIENCY: ICD-10-CM

## 2022-07-05 DIAGNOSIS — E03.9 HYPOTHYROIDISM, UNSPECIFIED TYPE: ICD-10-CM

## 2022-07-05 DIAGNOSIS — M85.859 OSTEOPENIA OF THIGH, UNSPECIFIED LATERALITY: ICD-10-CM

## 2022-07-05 DIAGNOSIS — Z78.9 ALCOHOL USE: ICD-10-CM

## 2022-07-05 DIAGNOSIS — D72.819 LEUKOPENIA, UNSPECIFIED TYPE: ICD-10-CM

## 2022-07-05 DIAGNOSIS — Z17.0 MALIGNANT NEOPLASM OF OVERLAPPING SITES OF LEFT BREAST IN FEMALE, ESTROGEN RECEPTOR POSITIVE: ICD-10-CM

## 2022-07-05 DIAGNOSIS — E55.9 VITAMIN D INSUFFICIENCY: ICD-10-CM

## 2022-07-05 PROCEDURE — 99214 PR OFFICE/OUTPT VISIT, EST, LEVL IV, 30-39 MIN: ICD-10-PCS | Mod: S$PBB,,, | Performed by: INTERNAL MEDICINE

## 2022-07-05 PROCEDURE — 99214 OFFICE O/P EST MOD 30 MIN: CPT | Mod: S$PBB,,, | Performed by: INTERNAL MEDICINE

## 2022-07-05 PROCEDURE — 99999 PR PBB SHADOW E&M-EST. PATIENT-LVL III: ICD-10-PCS | Mod: PBBFAC,,, | Performed by: INTERNAL MEDICINE

## 2022-07-05 PROCEDURE — 99213 OFFICE O/P EST LOW 20 MIN: CPT | Mod: PBBFAC,PN | Performed by: INTERNAL MEDICINE

## 2022-07-05 PROCEDURE — 99999 PR PBB SHADOW E&M-EST. PATIENT-LVL III: CPT | Mod: PBBFAC,,, | Performed by: INTERNAL MEDICINE

## 2022-07-05 NOTE — PATIENT INSTRUCTIONS
Mixed hyperlipidemia: Maintain low fat high fiber diet, exercise regularly. Weight reduction where indicated. Cont pravastatin 80 mg at night.   -     TSH; Future; Expected date: 07/05/2022  -     T4, Free; Future; Expected date: 07/05/2022    Hypothyroidism, unspecified type; cont same levothyroxine dosing; TFT on f/u.   -     TSH; Future; Expected date: 07/05/2022  -     T4, Free; Future; Expected date: 07/05/2022    Impaired fasting glucose: Exercise recommended with weight reduction and low carb diet; we'll follow hemoglobin A1c's with you periodically.  -     Basic Metabolic Panel; Future; Expected date: 07/05/2022    Osteopenia of thigh, unspecified laterality: Weight bearing exercises, continue calcium and vit D supplements. DEXA scabn at 2 yr intervals as needed. DEXA due after 2/24/23.     Vitamin D insufficiency: cont Vit D3 at 2000 iu a day.   -     Vitamin D; Future; Expected date: 07/05/2022    Vitamin B12 deficiency: at 500 mcg a day. Level 502.   -     Vitamin D; Future; Expected date: 07/05/2022    Malignant neoplasm of overlapping sites of left breast in female, estrogen receptor positive; IN+; HER2+ as well. Sees Onc Dr Morel as directed; Off arimidex now took for 5 1/2 yrs.  S/p patricio mastectomy w hip flaps. 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 Neoadjuvant. . Chemo: TCH tolerated well; also external beam radiation complete 5/2009.     Leukopenia, unspecified type; suspect due to alcohol and possibly meds as well. To wean down alcohol to 5 a week or less.   -     CBC Auto Differential; Future; Expected date: 07/05/2022  -     Basic Metabolic Panel; Future; Expected date: 07/05/2022    Neutropenia, unspecified type; as above.   -     CBC Auto Differential; Future; Expected date: 07/05/2022  -     Basic Metabolic Panel; Future; Expected date: 07/05/2022    Alcohol use: presently at avr 21 a week at 3 glasses of wine a day; to wean down to 5 or less a week; informed of increased risk  of breast cancer w 10 glasses of alcohol a week. Also will help w her leukopenia/neutropenia. No NSAID agents as well; can use tylenol for pain

## 2022-07-05 NOTE — PROGRESS NOTES
Subjective:       Patient ID: Tiffanie Black is a 76 y.o. female.    Chief Complaint: Follow-up    HPI:  Here for follow-up reassessment and go over lab work.  Mixed hyperlipidemia: Maintain low fat high fiber diet, exercise regularly. Weight reduction where indicated. Cont pravastatin 80 mg at night.  Lipid profile:  Cholesterol 170/triglyceride 87/HDL 60/LDL 92.6.  On pravastatin 80 mg.  -     TSH; Future; Expected date: 07/05/2022  -     T4, Free; Future; Expected date: 07/05/2022  Hypothyroidism, unspecified type; cont same levothyroxine dosing; TFT on f/u.   -     TSH; Future; Expected date: 07/05/2022  -     T4, Free; Future; Expected date: 07/05/2022  Impaired fasting glucose: Exercise recommended with weight reduction and low carb diet; we'll follow hemoglobin A1c's with you periodically.  Fasting blood sugar 93 with hemoglobin A1c 5.1.  -     Basic Metabolic Panel; Future; Expected date: 07/05/2022  Osteopenia of thigh, unspecified laterality: Weight bearing exercises, continue calcium and vit D supplements. DEXA scabn at 2 yr intervals as needed. DEXA due after 2/24/23.   Vitamin D insufficiency: cont Vit D3 at 2000 iu a day.   -     Vitamin D; Future; Expected date: 07/05/2022  Vitamin B12 deficiency: at 500 mcg a day. Level 502.   -     Vitamin D; Future; Expected date: 07/05/2022  Malignant neoplasm of overlapping sites of left breast in female, estrogen receptor positive; GA+; HER2+ as well. Sees Onc Dr Morel as directed; off arimidex now took for 5 1/2 yrs.  S/p patricio mastectomy w hip flaps. 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 Neoadjuvant. . Chemo: TCH tolerated well; also external beam radiation complete 5/2009.   Leukopenia, unspecified type; suspect due to alcohol and possibly meds as well. To wean down alcohol to 5 a week or less.    -     CBC Auto Differential; Future; Expected date: 07/05/2022  -     Basic Metabolic Panel; Future; Expected date: 07/05/2022  Neutropenia,  "unspecified type; as above.   -     CBC Auto Differential; Future; Expected date: 07/05/2022  -     Basic Metabolic Panel; Future; Expected date: 07/05/2022  Alcohol use: presently at avr 21 a week at 3 glasses of wine a day; to wean down to 5 or less a week; informed of increased risk of breast cancer w 10 gloasses of alcohol a week. Also will help w her leukopenia/neutropenia. No NSAID agents as well; can use tylenol for pain  Encounter for lab test:  Labs reviewed and discussed with patient at length in ordered for follow-up.      Review of Systems   Constitutional: Negative for appetite change and fever.   HENT: Negative for congestion, postnasal drip, rhinorrhea and sinus pressure.    Eyes: Negative for discharge and itching.   Respiratory: Negative for cough, chest tightness, shortness of breath and wheezing.    Cardiovascular: Negative for chest pain, palpitations and leg swelling.   Gastrointestinal: Negative for abdominal distention, abdominal pain, blood in stool, constipation, diarrhea, nausea and vomiting.   Endocrine: Negative for polydipsia, polyphagia and polyuria.   Genitourinary: Negative for dysuria and hematuria.   Musculoskeletal: Negative for arthralgias and myalgias.   Skin: Negative for rash.   Allergic/Immunologic: Negative for environmental allergies and food allergies.   Neurological: Negative for tremors, seizures and syncope.   Hematological: Negative for adenopathy. Does not bruise/bleed easily.   Psychiatric/Behavioral: Negative for dysphoric mood. The patient is not nervous/anxious.       Objective:        Vitals:    07/05/22 1010   BP: 124/78   BP Location: Right arm   Patient Position: Sitting   Pulse: 66   Resp: 17   Weight: 55.2 kg (121 lb 9.3 oz)   Height: 5' 4" (1.626 m)       BMI Readings from Last 3 Encounters:   07/05/22 20.87 kg/m²   04/01/22 22.14 kg/m²   02/28/22 22.31 kg/m²        Wt Readings from Last 3 Encounters:   07/05/22 1010 55.2 kg (121 lb 9.3 oz)   04/01/22 1132 " 58.5 kg (129 lb)   02/28/22 0910 59 kg (129 lb 15.4 oz)        BP Readings from Last 3 Encounters:   07/05/22 124/78   04/01/22 130/76   02/28/22 (!) 140/76        There are no preventive care reminders to display for this patient.     There are no preventive care reminders to display for this patient.      Past Medical History:   Diagnosis Date    Cancer 2008    left breast    Chemotherapy induced neutropenia     Hypothyroidism     Impaired fasting glucose     Mixed hyperlipidemia     Osteopenia     Pre-hypertension 4/27/2017    Seasonal allergies     Vitamin B12 deficiency     Vitamin D insufficiency        Past Surgical History:   Procedure Laterality Date    Bilateral mastectomy      BLADDER SUSPENSION  08/2012    Done same time as her Hysterectomy w BSO; Dr Smith her Gyn.     COLONOSCOPY  07/29/2015    Dr Scott; divert ds descending/sigmoid ; 5 yr f/u;.2020 due.    HYSTERECTOMY  08/2012    Robotic; w BSO; Dr Smith Gyn did her surgery. Bladder suspension done at same time.     JOINT REPLACEMENT Right 08/2015    Right hip total replaced. Dr Jaleesa kothari did her surgery       Social History     Tobacco Use    Smoking status: Former Smoker     Types: Cigarettes    Smokeless tobacco: Never Used   Substance Use Topics    Alcohol use: Yes     Alcohol/week: 2.0 - 3.0 standard drinks     Types: 2 - 3 Glasses of wine per week     Comment: nightly    Drug use: No       No family history on file.    Review of patient's allergies indicates:   Allergen Reactions    Cleocin [clindamycin hcl] Itching and Rash    Bactrim [sulfamethoxazole-trimethoprim] Hives    Biaxin [clarithromycin] Hives       Current Outpatient Medications on File Prior to Visit   Medication Sig Dispense Refill    calcium carbonate/vitamin D3 (CALCIUM 600 + D,3, ORAL) Take 1 tablet by mouth once daily. TAKES CALCIUM 1200MG +D3 1000IU TABLET DAILY      cholecalciferol, vitamin D3, 2,000 unit Tab Take 2,000 mg by mouth once daily.       clobetasol (OLUX) 0.05 % Foam APPLY TO AFFECTED AREA on scalp DAILY AS NEEDED  11    cyanocobalamin 500 MCG tablet Take 500 mcg by mouth once daily.      fluticasone propionate (FLONASE) 50 mcg/actuation nasal spray 1 spray by Each Nare route once daily.      ketoconazole (NIZORAL) 2 % shampoo apply to scalp leave on for 5 minutes then rinse 3times a week  11    levothyroxine (SYNTHROID) 75 MCG tablet Take 0.5 tablets (37.5 mcg total) by mouth before breakfast. 45 tablet 3    pravastatin (PRAVACHOL) 80 MG tablet Take 1 tablet (80 mg total) by mouth every evening. 90 tablet 1    [DISCONTINUED] hydrocortisone (ANUSOL-HC) 2.5 % rectal cream Apply rectally 2-3x a day as needed for hemorrhoids. (Patient not taking: No sig reported) 28 g 1    [DISCONTINUED] pantoprazole (PROTONIX) 40 MG tablet Take 1 tablet (40 mg total) by mouth once daily. (Patient not taking: No sig reported) 90 tablet 0     No current facility-administered medications on file prior to visit.       Physical Exam  Vitals reviewed.   Constitutional:       Appearance: She is well-developed.   HENT:      Head: Normocephalic and atraumatic.   Neck:      Thyroid: No thyromegaly.   Cardiovascular:      Rate and Rhythm: Normal rate and regular rhythm.      Heart sounds: Normal heart sounds. No murmur heard.    No gallop.   Pulmonary:      Effort: Pulmonary effort is normal. No respiratory distress.      Breath sounds: Normal breath sounds. No wheezing or rales.   Abdominal:      General: Bowel sounds are normal. There is no distension.      Palpations: Abdomen is soft.      Tenderness: There is no abdominal tenderness. There is no guarding or rebound.   Musculoskeletal:         General: Normal range of motion.      Cervical back: Normal range of motion and neck supple.   Lymphadenopathy:      Cervical: No cervical adenopathy.   Skin:     Findings: No rash.   Neurological:      Mental Status: She is alert and oriented to person, place, and time.       Comments: Moves all 4 extremities fine.   Psychiatric:         Behavior: Behavior normal.         Thought Content: Thought content normal.         Assessment:       1. Mixed hyperlipidemia    2. Hypothyroidism, unspecified type    3. Impaired fasting glucose    4. Osteopenia of thigh, unspecified laterality    5. Vitamin D insufficiency    6. Vitamin B12 deficiency    7. Malignant neoplasm of overlapping sites of left breast in female, estrogen receptor positive    8. Leukopenia, unspecified type    9. Neutropenia, unspecified type        Plan:       Mixed hyperlipidemia: Maintain low fat high fiber diet, exercise regularly. Weight reduction where indicated. Cont pravastatin 80 mg at night.  Lipid profile:  Cholesterol 170/triglyceride 87/HDL 60/LDL 92.6.  On pravastatin 80 mg.  -     TSH; Future; Expected date: 07/05/2022  -     T4, Free; Future; Expected date: 07/05/2022    Hypothyroidism, unspecified type; cont same levothyroxine dosing; TFT on f/u.   -     TSH; Future; Expected date: 07/05/2022  -     T4, Free; Future; Expected date: 07/05/2022    Impaired fasting glucose: Exercise recommended with weight reduction and low carb diet; we'll follow hemoglobin A1c's with you periodically.  -     Basic Metabolic Panel; Future; Expected date: 07/05/2022    Osteopenia of thigh, unspecified laterality: Weight bearing exercises, continue calcium and vit D supplements. DEXA scabn at 2 yr intervals as needed. DEXA due after 2/24/23.     Vitamin D insufficiency: cont Vit D3 at 2000 iu a day.   -     Vitamin D; Future; Expected date: 07/05/2022    Vitamin B12 deficiency: at 500 mcg a day. Level 502.   -     Vitamin D; Future; Expected date: 07/05/2022    Malignant neoplasm of overlapping sites of left breast in female, estrogen receptor positive; DC+; HER2+ as well. Sees Onc Dr Morel as directed; Off arimidex now took for 5 1/2 yrs.  S/p patricio mastectomy w hip flaps. 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  Neoadjuvant. . Chemo: TCH tolerated well; also external beam radiation complete 5/2009.     Leukopenia, unspecified type; suspect due to alcohol and possibly meds as well. To wean down alcohol to 5 a week or less.   -     CBC Auto Differential; Future; Expected date: 07/05/2022  -     Basic Metabolic Panel; Future; Expected date: 07/05/2022    Neutropenia, unspecified type; as above.   -     CBC Auto Differential; Future; Expected date: 07/05/2022  -     Basic Metabolic Panel; Future; Expected date: 07/05/2022    Alcohol use: presently at avr 21 a week at 3 glasses of wine a day; to wean down to 5 or less a week; informed of increased risk of breast cancer w 10 gloasses of alcohol a week. Also will help w her leukopenia/neutropenia. No NSAID agents as well; can use tylenol for pain    Encounter for lab test:  Labs reviewed and discussed with patient at length in ordered for follow-up.

## 2022-08-15 DIAGNOSIS — E78.2 MIXED HYPERLIPIDEMIA: ICD-10-CM

## 2022-08-16 RX ORDER — PRAVASTATIN SODIUM 80 MG/1
80 TABLET ORAL NIGHTLY
Qty: 90 TABLET | Refills: 3 | Status: SHIPPED | OUTPATIENT
Start: 2022-08-16 | End: 2023-06-15

## 2022-08-16 NOTE — TELEPHONE ENCOUNTER
Refill Decision Note   Tiffanie Black  is requesting a refill authorization.  Brief Assessment and Rationale for Refill:  Approve     Medication Therapy Plan:       Medication Reconciliation Completed: No   Comments:     No Care Gaps recommended.     Note composed:11:05 AM 08/16/2022

## 2022-08-16 NOTE — TELEPHONE ENCOUNTER
No new care gaps identified.  [facility] Embedded Care Gaps. Reference number: [id number]. 8/15/2022   9:04:21 PM CDT

## 2022-10-09 ENCOUNTER — TELEPHONE (OUTPATIENT)
Dept: FAMILY MEDICINE | Facility: CLINIC | Age: 77
End: 2022-10-09
Payer: MEDICARE

## 2022-10-10 NOTE — TELEPHONE ENCOUNTER
9/22 E visit appeared after 9/22 and was not on my schedule for that day; she reportedly checked in at 1:38 a.m.; called to check on patient today and she apparently was diagnosed with COVID 8/11 and had some residual drainage with some residual cough ; home COVID test was negative recently no fever but just some persistent cough with nasal congestion .Mucinex DM and Zyrtec were no help she did receive some help from Tessalon Perles.  No risk factors for COVID exposure noted.  She subsequent went to ENT and was prescribed Levaquin as antibiotic and prednisone as well as cough syrup with codeine and is doing better and has a follow-up with her this Thursday.  Appreciated the fact that I called to check up on her

## 2022-11-10 ENCOUNTER — LAB VISIT (OUTPATIENT)
Dept: LAB | Facility: CLINIC | Age: 77
End: 2022-11-10
Payer: MEDICARE

## 2022-11-10 DIAGNOSIS — E03.9 HYPOTHYROIDISM, UNSPECIFIED TYPE: ICD-10-CM

## 2022-11-10 DIAGNOSIS — E55.9 VITAMIN D INSUFFICIENCY: ICD-10-CM

## 2022-11-10 DIAGNOSIS — E78.2 MIXED HYPERLIPIDEMIA: ICD-10-CM

## 2022-11-10 DIAGNOSIS — R73.01 IMPAIRED FASTING GLUCOSE: ICD-10-CM

## 2022-11-10 DIAGNOSIS — D72.819 LEUKOPENIA, UNSPECIFIED TYPE: ICD-10-CM

## 2022-11-10 DIAGNOSIS — D70.9 NEUTROPENIA, UNSPECIFIED TYPE: ICD-10-CM

## 2022-11-10 DIAGNOSIS — E53.8 VITAMIN B12 DEFICIENCY: ICD-10-CM

## 2022-11-10 LAB
25(OH)D3+25(OH)D2 SERPL-MCNC: 40 NG/ML (ref 30–96)
ANION GAP SERPL CALC-SCNC: 13 MMOL/L (ref 8–16)
BASOPHILS # BLD AUTO: 0.02 K/UL (ref 0–0.2)
BASOPHILS NFR BLD: 0.4 % (ref 0–1.9)
BUN SERPL-MCNC: 16 MG/DL (ref 8–23)
CALCIUM SERPL-MCNC: 10 MG/DL (ref 8.7–10.5)
CHLORIDE SERPL-SCNC: 105 MMOL/L (ref 95–110)
CO2 SERPL-SCNC: 26 MMOL/L (ref 23–29)
CREAT SERPL-MCNC: 0.7 MG/DL (ref 0.5–1.4)
DIFFERENTIAL METHOD: ABNORMAL
EOSINOPHIL # BLD AUTO: 0.2 K/UL (ref 0–0.5)
EOSINOPHIL NFR BLD: 3.4 % (ref 0–8)
ERYTHROCYTE [DISTWIDTH] IN BLOOD BY AUTOMATED COUNT: 13.1 % (ref 11.5–14.5)
EST. GFR  (NO RACE VARIABLE): >60 ML/MIN/1.73 M^2
GLUCOSE SERPL-MCNC: 92 MG/DL (ref 70–110)
HCT VFR BLD AUTO: 44.8 % (ref 37–48.5)
HGB BLD-MCNC: 14.3 G/DL (ref 12–16)
IMM GRANULOCYTES # BLD AUTO: 0.01 K/UL (ref 0–0.04)
IMM GRANULOCYTES NFR BLD AUTO: 0.2 % (ref 0–0.5)
LYMPHOCYTES # BLD AUTO: 1.5 K/UL (ref 1–4.8)
LYMPHOCYTES NFR BLD: 32.5 % (ref 18–48)
MCH RBC QN AUTO: 30.6 PG (ref 27–31)
MCHC RBC AUTO-ENTMCNC: 31.9 G/DL (ref 32–36)
MCV RBC AUTO: 96 FL (ref 82–98)
MONOCYTES # BLD AUTO: 0.6 K/UL (ref 0.3–1)
MONOCYTES NFR BLD: 13.9 % (ref 4–15)
NEUTROPHILS # BLD AUTO: 2.2 K/UL (ref 1.8–7.7)
NEUTROPHILS NFR BLD: 49.6 % (ref 38–73)
NRBC BLD-RTO: 0 /100 WBC
PLATELET # BLD AUTO: 217 K/UL (ref 150–450)
PMV BLD AUTO: 10.9 FL (ref 9.2–12.9)
POTASSIUM SERPL-SCNC: 4.5 MMOL/L (ref 3.5–5.1)
RBC # BLD AUTO: 4.67 M/UL (ref 4–5.4)
SODIUM SERPL-SCNC: 144 MMOL/L (ref 136–145)
T4 FREE SERPL-MCNC: 0.74 NG/DL (ref 0.71–1.51)
TSH SERPL DL<=0.005 MIU/L-ACNC: 2.27 UIU/ML (ref 0.4–4)
WBC # BLD AUTO: 4.46 K/UL (ref 3.9–12.7)

## 2022-11-10 PROCEDURE — 80048 BASIC METABOLIC PNL TOTAL CA: CPT | Performed by: INTERNAL MEDICINE

## 2022-11-10 PROCEDURE — 36415 PR COLLECTION VENOUS BLOOD,VENIPUNCTURE: ICD-10-PCS | Mod: ,,, | Performed by: STUDENT IN AN ORGANIZED HEALTH CARE EDUCATION/TRAINING PROGRAM

## 2022-11-10 PROCEDURE — 84443 ASSAY THYROID STIM HORMONE: CPT | Performed by: INTERNAL MEDICINE

## 2022-11-10 PROCEDURE — 36415 COLL VENOUS BLD VENIPUNCTURE: CPT | Mod: ,,, | Performed by: STUDENT IN AN ORGANIZED HEALTH CARE EDUCATION/TRAINING PROGRAM

## 2022-11-10 PROCEDURE — 85025 COMPLETE CBC W/AUTO DIFF WBC: CPT | Performed by: INTERNAL MEDICINE

## 2022-11-10 PROCEDURE — 82306 VITAMIN D 25 HYDROXY: CPT | Performed by: INTERNAL MEDICINE

## 2022-11-10 PROCEDURE — 84439 ASSAY OF FREE THYROXINE: CPT | Performed by: INTERNAL MEDICINE

## 2022-11-17 ENCOUNTER — OFFICE VISIT (OUTPATIENT)
Dept: FAMILY MEDICINE | Facility: CLINIC | Age: 77
End: 2022-11-17
Payer: MEDICARE

## 2022-11-17 VITALS
DIASTOLIC BLOOD PRESSURE: 76 MMHG | HEIGHT: 64 IN | OXYGEN SATURATION: 96 % | SYSTOLIC BLOOD PRESSURE: 126 MMHG | WEIGHT: 122.56 LBS | HEART RATE: 97 BPM | BODY MASS INDEX: 20.92 KG/M2

## 2022-11-17 DIAGNOSIS — R05.3 PERSISTENT COUGH: ICD-10-CM

## 2022-11-17 DIAGNOSIS — J30.89 PERENNIAL ALLERGIC RHINITIS: ICD-10-CM

## 2022-11-17 DIAGNOSIS — Z01.89 ENCOUNTER FOR LABORATORY TEST: ICD-10-CM

## 2022-11-17 DIAGNOSIS — U07.1 COVID-19 VIRUS INFECTION: Primary | ICD-10-CM

## 2022-11-17 DIAGNOSIS — J30.9 ALLERGIC RHINITIS, UNSPECIFIED SEASONALITY, UNSPECIFIED TRIGGER: ICD-10-CM

## 2022-11-17 DIAGNOSIS — J20.9 ACUTE BRONCHITIS, UNSPECIFIED ORGANISM: ICD-10-CM

## 2022-11-17 DIAGNOSIS — Z98.890 HISTORY OF SINUS SURGERY: ICD-10-CM

## 2022-11-17 DIAGNOSIS — B96.89 ACUTE BACTERIAL RHINOSINUSITIS: ICD-10-CM

## 2022-11-17 DIAGNOSIS — J01.90 ACUTE BACTERIAL RHINOSINUSITIS: ICD-10-CM

## 2022-11-17 DIAGNOSIS — R09.82 POST-NASAL DRIP: ICD-10-CM

## 2022-11-17 PROCEDURE — 99214 OFFICE O/P EST MOD 30 MIN: CPT | Mod: S$PBB,CR,, | Performed by: INTERNAL MEDICINE

## 2022-11-17 PROCEDURE — 99999 PR PBB SHADOW E&M-EST. PATIENT-LVL III: ICD-10-PCS | Mod: PBBFAC,CR,, | Performed by: INTERNAL MEDICINE

## 2022-11-17 PROCEDURE — 99999 PR PBB SHADOW E&M-EST. PATIENT-LVL III: CPT | Mod: PBBFAC,CR,, | Performed by: INTERNAL MEDICINE

## 2022-11-17 PROCEDURE — 99213 OFFICE O/P EST LOW 20 MIN: CPT | Mod: PBBFAC,PN | Performed by: INTERNAL MEDICINE

## 2022-11-17 PROCEDURE — 99214 PR OFFICE/OUTPT VISIT, EST, LEVL IV, 30-39 MIN: ICD-10-PCS | Mod: S$PBB,CR,, | Performed by: INTERNAL MEDICINE

## 2022-11-17 RX ORDER — PREDNISONE 20 MG/1
TABLET ORAL
Qty: 12 TABLET | Refills: 0 | Status: SHIPPED | OUTPATIENT
Start: 2022-11-17 | End: 2023-07-06

## 2022-11-17 RX ORDER — BENZONATATE 100 MG/1
CAPSULE ORAL
Qty: 42 CAPSULE | Refills: 1 | Status: SHIPPED | OUTPATIENT
Start: 2022-11-17 | End: 2023-07-06

## 2022-11-17 RX ORDER — DOXYCYCLINE 100 MG/1
100 CAPSULE ORAL EVERY 12 HOURS
Qty: 20 CAPSULE | Refills: 0 | Status: SHIPPED | OUTPATIENT
Start: 2022-11-17 | End: 2022-11-27

## 2022-11-17 RX ORDER — PROMETHAZINE HYDROCHLORIDE AND DEXTROMETHORPHAN HYDROBROMIDE 6.25; 15 MG/5ML; MG/5ML
5 SYRUP ORAL EVERY 4 HOURS PRN
Qty: 240 ML | Refills: 0 | Status: SHIPPED | OUTPATIENT
Start: 2022-11-17 | End: 2023-07-06

## 2022-11-17 NOTE — PROGRESS NOTES
Subjective:       Patient ID: Tiffanie Black is a 77 y.o. female.    Chief Complaint: Follow-up (Blood work)  Here today for reassessment as well as to go over blood work  Follow-up  Associated symptoms include congestion. Pertinent negatives include no abdominal pain, arthralgias, chest pain, coughing, fatigue, fever, myalgias, nausea, rash, sore throat or vomiting.   COVID-19 virus infection: initially positive 8/12/22 w intermittent cough since.  Prior COVID infection 8/12 COVID test positive dry cough symptoms then and no drainage slight headache with decreased taste/smell for 2-4 days.  Two days with minor fever was back to baseline in 2 weeks.  Cough reportedly came back late September sore ENT 1 week of prednisone with cough syrup chest x-ray was negative Tessalon Perles helped.  No audible wheezing or shortness of breath cleared then October 4th; she took cough medicine for 1 week.  Was fine for 3 weeks then symptoms cough gradually returned around 10/25 with no colored or clear phlegm.  No fever or chills but postnasal drip and runny nose nasal congestion with slight sinus congestion but no sore throat she resumed her cough syrup her cough and would keep her awake around 4:25 a.m. for around 45 minutes then would get better.  No myalgias arthralgias or fatigue some seasonal allergies with Flonase helping.  She is allergic to the house with dust and roaches. Flonase/Mucinex and on Claritin    Persistent cough: See above  -     benzonatate (TESSALON) 100 MG capsule; 100-200 mg po TID as needed for cough; generic  Dispense: 42 capsule; Refill: 1  -     doxycycline (VIBRAMYCIN) 100 MG Cap; Take 1 capsule (100 mg total) by mouth every 12 (twelve) hours. Generic; please take after food. for 10 days  Dispense: 20 capsule; Refill: 0  -     promethazine-dextromethorphan (PROMETHAZINE-DM) 6.25-15 mg/5 mL Syrp; Take 5 mLs by mouth every 4 (four) hours as needed (cough /spasm). Max 4x a 24 hr period. Generic   Dispense: 240 mL; Refill: 0  -     predniSONE (DELTASONE) 20 MG tablet; 20 mg po BID for 2 days; then 30 mg po daily for 2 days; then 20 mg po daily for 3 days; then stop. Generic  Dispense: 12 tablet; Refill: 0    Acute bronchitis, unspecified organism; mucinex DM otc for cough/congestion  -     benzonatate (TESSALON) 100 MG capsule; 100-200 mg po TID as needed for cough; generic  Dispense: 42 capsule; Refill: 1  -     doxycycline (VIBRAMYCIN) 100 MG Cap; Take 1 capsule (100 mg total) by mouth every 12 (twelve) hours. Generic; please take after food. for 10 days  Dispense: 20 capsule; Refill: 0  -     predniSONE (DELTASONE) 20 MG tablet; 20 mg po BID for 2 days; then 30 mg po daily for 2 days; then 20 mg po daily for 3 days; then stop. Generic  Dispense: 12 tablet; Refill: 0    Post-nasal drip; CLARITIN AND FLONASE FOR CONGESTION AND DRIP.  Astepro no help.  -     benzonatate (TESSALON) 100 MG capsule; 100-200 mg po TID as needed for cough; generic  Dispense: 42 capsule; Refill: 1  -     predniSONE (DELTASONE) 20 MG tablet; 20 mg po BID for 2 days; then 30 mg po daily for 2 days; then 20 mg po daily for 3 days; then stop. Generic  Dispense: 12 tablet; Refill: 0    Acute bacterial rhinosinusitis; Simply saline otc for irrigation can use before flonase use.   -     doxycycline (VIBRAMYCIN) 100 MG Cap; Take 1 capsule (100 mg total) by mouth every 12 (twelve) hours. Generic; please take after food. for 10 days  Dispense: 20 capsule; Refill: 0  -     predniSONE (DELTASONE) 20 MG tablet; 20 mg po BID for 2 days; then 30 mg po daily for 2 days; then 20 mg po daily for 3 days; then stop. Generic  Dispense: 12 tablet; Refill: 0    History of sinus surgery    Perennial allergic rhinitis; claritin and flonase for congestion/drip.   -     predniSONE (DELTASONE) 20 MG tablet; 20 mg po BID for 2 days; then 30 mg po daily for 2 days; then 20 mg po daily for 3 days; then stop. Generic  Dispense: 12 tablet; Refill: 0    Allergic  "rhinitis, unspecified seasonality, unspecified trigger;allergic house dust; roaches, and blooming plant that blooms in fall.   -     predniSONE (DELTASONE) 20 MG tablet; 20 mg po BID for 2 days; then 30 mg po daily for 2 days; then 20 mg po daily for 3 days; then stop. Generic  Dispense: 12 tablet; Refill: 0      Encounter for lab tests. Reviewed w pt and not needed for f/u.       Review of Systems   Constitutional:  Negative for appetite change, fatigue and fever.   HENT:  Positive for congestion and postnasal drip. Negative for rhinorrhea, sinus pressure and sore throat.    Eyes:  Negative for discharge and itching.   Respiratory:  Negative for cough, chest tightness, shortness of breath and wheezing.    Cardiovascular:  Negative for chest pain, palpitations and leg swelling.   Gastrointestinal:  Negative for abdominal distention, abdominal pain, blood in stool, constipation, diarrhea, nausea and vomiting.   Endocrine: Negative for polydipsia, polyphagia and polyuria.   Genitourinary:  Negative for dysuria and hematuria.   Musculoskeletal:  Negative for arthralgias and myalgias.   Skin:  Negative for rash.   Allergic/Immunologic: Negative for environmental allergies and food allergies.   Neurological:  Negative for tremors, seizures and syncope.   Hematological:  Negative for adenopathy. Does not bruise/bleed easily.   Psychiatric/Behavioral:  Negative for dysphoric mood. The patient is not nervous/anxious.     Objective:        Vitals:    11/17/22 1534   BP: 126/76   Pulse: 97   SpO2: 96%   Weight: 55.6 kg (122 lb 9.2 oz)   Height: 5' 4" (1.626 m)       BMI Readings from Last 3 Encounters:   11/17/22 21.04 kg/m²   07/05/22 20.87 kg/m²   04/01/22 22.14 kg/m²        Wt Readings from Last 3 Encounters:   11/17/22 1534 55.6 kg (122 lb 9.2 oz)   07/05/22 1010 55.2 kg (121 lb 9.3 oz)   04/01/22 1132 58.5 kg (129 lb)        BP Readings from Last 3 Encounters:   11/17/22 126/76   07/05/22 124/78   04/01/22 130/76    "     There are no preventive care reminders to display for this patient.     Health Maintenance Due   Topic Date Due    COVID-19 Vaccine (5 - Booster for Pfizer series) 08/01/2022         Past Medical History:   Diagnosis Date    Cancer 2008    left breast    Chemotherapy induced neutropenia     Hypothyroidism     Impaired fasting glucose     Mixed hyperlipidemia     Osteopenia     Pre-hypertension 4/27/2017    Seasonal allergies     Vitamin B12 deficiency     Vitamin D insufficiency        Past Surgical History:   Procedure Laterality Date    Bilateral mastectomy      BLADDER SUSPENSION  08/2012    Done same time as her Hysterectomy w BSO; Dr Smith her Gyn.     COLONOSCOPY  07/29/2015    Dr Scott; divert ds descending/sigmoid ; 5 yr f/u;.2020 due.    HYSTERECTOMY  08/2012    Robotic; w BSO; Dr Smith Gyn did her surgery. Bladder suspension done at same time.     JOINT REPLACEMENT Right 08/2015    Right hip total replaced. Dr Jaleesa kothari did her surgery       Social History     Tobacco Use    Smoking status: Former     Types: Cigarettes    Smokeless tobacco: Never   Substance Use Topics    Alcohol use: Yes     Alcohol/week: 2.0 - 3.0 standard drinks     Types: 2 - 3 Glasses of wine per week     Comment: nightly    Drug use: No       No family history on file.    Review of patient's allergies indicates:   Allergen Reactions    Cleocin [clindamycin hcl] Itching and Rash    Bactrim [sulfamethoxazole-trimethoprim] Hives    Biaxin [clarithromycin] Hives       Current Outpatient Medications on File Prior to Visit   Medication Sig Dispense Refill    calcium carbonate/vitamin D3 (CALCIUM 600 + D,3, ORAL) Take 1 tablet by mouth once daily. TAKES CALCIUM 1200MG +D3 1000IU TABLET DAILY      cholecalciferol, vitamin D3, 2,000 unit Tab Take 2,000 mg by mouth once daily.      clobetasol (OLUX) 0.05 % Foam APPLY TO AFFECTED AREA on scalp DAILY AS NEEDED  11    cyanocobalamin 500 MCG tablet Take 500 mcg by mouth once daily.       fluticasone propionate (FLONASE) 50 mcg/actuation nasal spray 1 spray by Each Nare route once daily.      ketoconazole (NIZORAL) 2 % shampoo apply to scalp leave on for 5 minutes then rinse 3times a week  11    levothyroxine (SYNTHROID) 75 MCG tablet Take 0.5 tablets (37.5 mcg total) by mouth before breakfast. 45 tablet 3    pravastatin (PRAVACHOL) 80 MG tablet Take 1 tablet (80 mg total) by mouth every evening. 90 tablet 3     No current facility-administered medications on file prior to visit.       Physical Exam  Vitals reviewed.   Constitutional:       Appearance: She is well-developed.   HENT:      Head: Normocephalic and atraumatic.   Neck:      Thyroid: No thyromegaly.   Cardiovascular:      Rate and Rhythm: Normal rate and regular rhythm.      Heart sounds: Normal heart sounds. No murmur heard.    No gallop.   Pulmonary:      Effort: Pulmonary effort is normal. No respiratory distress.      Breath sounds: Normal breath sounds. No wheezing or rales.      Comments: CTA w mild post-tussive wheeze noted.   Abdominal:      General: Bowel sounds are normal. There is no distension.      Palpations: Abdomen is soft.      Tenderness: There is no abdominal tenderness. There is no guarding or rebound.   Musculoskeletal:         General: Normal range of motion.      Cervical back: Normal range of motion and neck supple.      Right lower leg: No edema.      Left lower leg: No edema.   Lymphadenopathy:      Cervical: No cervical adenopathy.   Skin:     Findings: No rash.   Neurological:      Mental Status: She is alert and oriented to person, place, and time.      Comments: Moves all 4 extremities fine.   Psychiatric:         Behavior: Behavior normal.         Thought Content: Thought content normal.       Assessment:       1. COVID-19 virus infection    2. Persistent cough    3. Acute bronchitis, unspecified organism    4. Post-nasal drip    5. Acute bacterial rhinosinusitis    6. History of sinus surgery    7. Perennial  allergic rhinitis    8. Allergic rhinitis, unspecified seasonality, unspecified trigger    9. Encounter for laboratory test        Plan:       COVID-19 virus infection: initially positive 8/12/22 w intermittent cough since.  Prior COVID infection 8/12 COVID test positive dry cough symptoms then and no drainage slight headache with decreased taste/smell for 2-4 days.  Two days with minor fever was back to baseline in 2 weeks.  Cough reportedly came back late September saw ENT; 1 week of prednisone with cough syrup chest x-ray was negative; Tessalon Perles helped.  No audible wheezing or shortness of breath; cough cleared then October 4th; she took cough medicine for 1 week.  Was fine for 3 weeks then symptoms cough gradually returned around 10/25 with no colored or clear phlegm.  No fever or chills but postnasal drip and runny nose and nasal congestion with slight sinus congestion but no sore throat; she resumed her cough syrup; her cough would keep her awake around 4 through 5 a.m. for around 45 minutes then would get better.  [place] drops helped.  No myalgias arthralgias or fatigue; some seasonal allergies with Flonase helping.  She is allergic to the house with dust and roaches. Flonase/Mucinex and on Claritin.    Persistent cough: See above  -     benzonatate (TESSALON) 100 MG capsule; 100-200 mg po TID as needed for cough; generic  Dispense: 42 capsule; Refill: 1  -     doxycycline (VIBRAMYCIN) 100 MG Cap; Take 1 capsule (100 mg total) by mouth every 12 (twelve) hours. Generic; please take after food. for 10 days  Dispense: 20 capsule; Refill: 0  -     promethazine-dextromethorphan (PROMETHAZINE-DM) 6.25-15 mg/5 mL Syrp; Take 5 mLs by mouth every 4 (four) hours as needed (cough /spasm). Max 4x a 24 hr period. Generic  Dispense: 240 mL; Refill: 0  -     predniSONE (DELTASONE) 20 MG tablet; 20 mg po BID for 2 days; then 30 mg po daily for 2 days; then 20 mg po daily for 3 days; then stop. Generic  Dispense: 12  tablet; Refill: 0    Acute bronchitis, unspecified organism; mucinex DM otc for cough/congestion  -     benzonatate (TESSALON) 100 MG capsule; 100-200 mg po TID as needed for cough; generic  Dispense: 42 capsule; Refill: 1  -     doxycycline (VIBRAMYCIN) 100 MG Cap; Take 1 capsule (100 mg total) by mouth every 12 (twelve) hours. Generic; please take after food. for 10 days  Dispense: 20 capsule; Refill: 0  -     predniSONE (DELTASONE) 20 MG tablet; 20 mg po BID for 2 days; then 30 mg po daily for 2 days; then 20 mg po daily for 3 days; then stop. Generic  Dispense: 12 tablet; Refill: 0    Post-nasal drip; CLARITIN AND FLONASE FOR CONGESTION AND DRIP.   -     benzonatate (TESSALON) 100 MG capsule; 100-200 mg po TID as needed for cough; generic  Dispense: 42 capsule; Refill: 1  -     predniSONE (DELTASONE) 20 MG tablet; 20 mg po BID for 2 days; then 30 mg po daily for 2 days; then 20 mg po daily for 3 days; then stop. Generic  Dispense: 12 tablet; Refill: 0    Acute bacterial rhinosinusitis; Simply saline otc for irrigation can use before flonase use.   -     doxycycline (VIBRAMYCIN) 100 MG Cap; Take 1 capsule (100 mg total) by mouth every 12 (twelve) hours. Generic; please take after food. for 10 days  Dispense: 20 capsule; Refill: 0  -     predniSONE (DELTASONE) 20 MG tablet; 20 mg po BID for 2 days; then 30 mg po daily for 2 days; then 20 mg po daily for 3 days; then stop. Generic  Dispense: 12 tablet; Refill: 0    History of sinus surgery    Perennial allergic rhinitis; claritin and flonase for congestion/drip.  Mucinex DM for cough and congestion  -     predniSONE (DELTASONE) 20 MG tablet; 20 mg po BID for 2 days; then 30 mg po daily for 2 days; then 20 mg po daily for 3 days; then stop. Generic  Dispense: 12 tablet; Refill: 0    Allergic rhinitis, unspecified seasonality, unspecified trigger; allergic house dust; roaches, and blooming plant that blooms in fall.   -     predniSONE (DELTASONE) 20 MG tablet; 20 mg  po BID for 2 days; then 30 mg po daily for 2 days; then 20 mg po daily for 3 days; then stop. Generic  Dispense: 12 tablet; Refill: 0      Encounter for lab tests. Reviewed w pt and not needed for f/u.

## 2022-11-17 NOTE — PATIENT INSTRUCTIONS
COVID-19 virus infection: initially positive 8/12/22 w intermittent cough since.     Persistent cough  -     benzonatate (TESSALON) 100 MG capsule; 100-200 mg po TID as needed for cough; generic  Dispense: 42 capsule; Refill: 1  -     doxycycline (VIBRAMYCIN) 100 MG Cap; Take 1 capsule (100 mg total) by mouth every 12 (twelve) hours. Generic; please take after food. for 10 days  Dispense: 20 capsule; Refill: 0  -     promethazine-dextromethorphan (PROMETHAZINE-DM) 6.25-15 mg/5 mL Syrp; Take 5 mLs by mouth every 4 (four) hours as needed (cough /spasm). Max 4x a 24 hr period. Generic  Dispense: 240 mL; Refill: 0  -     predniSONE (DELTASONE) 20 MG tablet; 20 mg po BID for 2 days; then 30 mg po daily for 2 days; then 20 mg po daily for 3 days; then stop. Generic  Dispense: 12 tablet; Refill: 0    Acute bronchitis, unspecified organism; mucinex DM otc for cough/congestion  -     benzonatate (TESSALON) 100 MG capsule; 100-200 mg po TID as needed for cough; generic  Dispense: 42 capsule; Refill: 1  -     doxycycline (VIBRAMYCIN) 100 MG Cap; Take 1 capsule (100 mg total) by mouth every 12 (twelve) hours. Generic; please take after food. for 10 days  Dispense: 20 capsule; Refill: 0  -     predniSONE (DELTASONE) 20 MG tablet; 20 mg po BID for 2 days; then 30 mg po daily for 2 days; then 20 mg po daily for 3 days; then stop. Generic  Dispense: 12 tablet; Refill: 0    Post-nasal drip; CLARITIN AND FLONASE FOR CONGESTION AND DRIP.   -     benzonatate (TESSALON) 100 MG capsule; 100-200 mg po TID as needed for cough; generic  Dispense: 42 capsule; Refill: 1  -     predniSONE (DELTASONE) 20 MG tablet; 20 mg po BID for 2 days; then 30 mg po daily for 2 days; then 20 mg po daily for 3 days; then stop. Generic  Dispense: 12 tablet; Refill: 0    Acute bacterial rhinosinusitis; Simply saline otc for irrigation can use before flonase use.   -     doxycycline (VIBRAMYCIN) 100 MG Cap; Take 1 capsule (100 mg total) by mouth every 12  (twelve) hours. Generic; please take after food. for 10 days  Dispense: 20 capsule; Refill: 0  -     predniSONE (DELTASONE) 20 MG tablet; 20 mg po BID for 2 days; then 30 mg po daily for 2 days; then 20 mg po daily for 3 days; then stop. Generic  Dispense: 12 tablet; Refill: 0    History of sinus surgery    Perennial allergic rhinitis; claritin and flonase for congestion/drip.   -     predniSONE (DELTASONE) 20 MG tablet; 20 mg po BID for 2 days; then 30 mg po daily for 2 days; then 20 mg po daily for 3 days; then stop. Generic  Dispense: 12 tablet; Refill: 0    Allergic rhinitis, unspecified seasonality, unspecified trigger;allergic house dust; roaches, and blooming plant that blooms in fall.   -     predniSONE (DELTASONE) 20 MG tablet; 20 mg po BID for 2 days; then 30 mg po daily for 2 days; then 20 mg po daily for 3 days; then stop. Generic  Dispense: 12 tablet; Refill: 0

## 2023-02-22 ENCOUNTER — PES CALL (OUTPATIENT)
Dept: ADMINISTRATIVE | Facility: CLINIC | Age: 78
End: 2023-02-22
Payer: MEDICARE

## 2023-04-24 ENCOUNTER — PATIENT MESSAGE (OUTPATIENT)
Dept: FAMILY MEDICINE | Facility: CLINIC | Age: 78
End: 2023-04-24
Payer: MEDICARE

## 2023-04-24 DIAGNOSIS — E53.8 VITAMIN B12 DEFICIENCY: ICD-10-CM

## 2023-04-24 DIAGNOSIS — M85.859 OSTEOPENIA OF THIGH, UNSPECIFIED LATERALITY: ICD-10-CM

## 2023-04-24 DIAGNOSIS — E03.9 HYPOTHYROIDISM, UNSPECIFIED TYPE: ICD-10-CM

## 2023-04-24 DIAGNOSIS — E55.9 VITAMIN D INSUFFICIENCY: ICD-10-CM

## 2023-04-24 DIAGNOSIS — R73.01 IMPAIRED FASTING GLUCOSE: ICD-10-CM

## 2023-04-24 DIAGNOSIS — E78.2 MIXED HYPERLIPIDEMIA: ICD-10-CM

## 2023-04-24 DIAGNOSIS — J30.2 SEASONAL ALLERGIES: Primary | ICD-10-CM

## 2023-05-01 NOTE — TELEPHONE ENCOUNTER
Please call patient to schedule her labs before her six-month follow-up appointment.  These to be obtained after an overnight fast of at least 8 hours.  These was sent to [facility]

## 2023-05-16 ENCOUNTER — LAB VISIT (OUTPATIENT)
Dept: PRIMARY CARE CLINIC | Facility: CLINIC | Age: 78
End: 2023-05-16
Payer: MEDICARE

## 2023-05-16 DIAGNOSIS — E55.9 VITAMIN D INSUFFICIENCY: ICD-10-CM

## 2023-05-16 DIAGNOSIS — M85.859 OSTEOPENIA OF THIGH, UNSPECIFIED LATERALITY: ICD-10-CM

## 2023-05-16 DIAGNOSIS — R73.01 IMPAIRED FASTING GLUCOSE: ICD-10-CM

## 2023-05-16 DIAGNOSIS — E53.8 VITAMIN B12 DEFICIENCY: ICD-10-CM

## 2023-05-16 DIAGNOSIS — E03.9 HYPOTHYROIDISM, UNSPECIFIED TYPE: ICD-10-CM

## 2023-05-16 DIAGNOSIS — E78.2 MIXED HYPERLIPIDEMIA: ICD-10-CM

## 2023-05-16 DIAGNOSIS — J30.2 SEASONAL ALLERGIES: ICD-10-CM

## 2023-05-16 LAB
25(OH)D3+25(OH)D2 SERPL-MCNC: 37 NG/ML (ref 30–96)
ALBUMIN SERPL BCP-MCNC: 4.1 G/DL (ref 3.5–5.2)
ALP SERPL-CCNC: 82 U/L (ref 55–135)
ALT SERPL W/O P-5'-P-CCNC: 18 U/L (ref 10–44)
ANION GAP SERPL CALC-SCNC: 12 MMOL/L (ref 8–16)
AST SERPL-CCNC: 21 U/L (ref 10–40)
BASOPHILS # BLD AUTO: 0.04 K/UL (ref 0–0.2)
BASOPHILS NFR BLD: 0.8 % (ref 0–1.9)
BILIRUB SERPL-MCNC: 0.5 MG/DL (ref 0.1–1)
BUN SERPL-MCNC: 11 MG/DL (ref 8–23)
CALCIUM SERPL-MCNC: 9.5 MG/DL (ref 8.7–10.5)
CHLORIDE SERPL-SCNC: 103 MMOL/L (ref 95–110)
CHOLEST SERPL-MCNC: 171 MG/DL (ref 120–199)
CHOLEST/HDLC SERPL: 3.3 {RATIO} (ref 2–5)
CO2 SERPL-SCNC: 27 MMOL/L (ref 23–29)
CREAT SERPL-MCNC: 0.8 MG/DL (ref 0.5–1.4)
DIFFERENTIAL METHOD: ABNORMAL
EOSINOPHIL # BLD AUTO: 0.3 K/UL (ref 0–0.5)
EOSINOPHIL NFR BLD: 6.7 % (ref 0–8)
ERYTHROCYTE [DISTWIDTH] IN BLOOD BY AUTOMATED COUNT: 12.8 % (ref 11.5–14.5)
EST. GFR  (NO RACE VARIABLE): >60 ML/MIN/1.73 M^2
ESTIMATED AVG GLUCOSE: 105 MG/DL (ref 68–131)
GLUCOSE SERPL-MCNC: 81 MG/DL (ref 70–110)
HBA1C MFR BLD: 5.3 % (ref 4–5.6)
HCT VFR BLD AUTO: 43.2 % (ref 37–48.5)
HDLC SERPL-MCNC: 52 MG/DL (ref 40–75)
HDLC SERPL: 30.4 % (ref 20–50)
HGB BLD-MCNC: 13.8 G/DL (ref 12–16)
IMM GRANULOCYTES # BLD AUTO: 0.01 K/UL (ref 0–0.04)
IMM GRANULOCYTES NFR BLD AUTO: 0.2 % (ref 0–0.5)
LDLC SERPL CALC-MCNC: 100.4 MG/DL (ref 63–159)
LYMPHOCYTES # BLD AUTO: 1.4 K/UL (ref 1–4.8)
LYMPHOCYTES NFR BLD: 27.3 % (ref 18–48)
MCH RBC QN AUTO: 30.1 PG (ref 27–31)
MCHC RBC AUTO-ENTMCNC: 31.9 G/DL (ref 32–36)
MCV RBC AUTO: 94 FL (ref 82–98)
MONOCYTES # BLD AUTO: 0.6 K/UL (ref 0.3–1)
MONOCYTES NFR BLD: 12.3 % (ref 4–15)
NEUTROPHILS # BLD AUTO: 2.7 K/UL (ref 1.8–7.7)
NEUTROPHILS NFR BLD: 52.7 % (ref 38–73)
NONHDLC SERPL-MCNC: 119 MG/DL
NRBC BLD-RTO: 0 /100 WBC
PLATELET # BLD AUTO: 211 K/UL (ref 150–450)
PMV BLD AUTO: 11 FL (ref 9.2–12.9)
POTASSIUM SERPL-SCNC: 4 MMOL/L (ref 3.5–5.1)
PROT SERPL-MCNC: 7 G/DL (ref 6–8.4)
RBC # BLD AUTO: 4.58 M/UL (ref 4–5.4)
SODIUM SERPL-SCNC: 142 MMOL/L (ref 136–145)
T3FREE SERPL-MCNC: 2.7 PG/ML (ref 2.3–4.2)
T4 FREE SERPL-MCNC: 0.86 NG/DL (ref 0.71–1.51)
TRIGL SERPL-MCNC: 93 MG/DL (ref 30–150)
TSH SERPL DL<=0.005 MIU/L-ACNC: 2.86 UIU/ML (ref 0.4–4)
VIT B12 SERPL-MCNC: 502 PG/ML (ref 210–950)
WBC # BLD AUTO: 5.05 K/UL (ref 3.9–12.7)

## 2023-05-16 PROCEDURE — 36415 PR COLLECTION VENOUS BLOOD,VENIPUNCTURE: ICD-10-PCS | Mod: S$GLB,,, | Performed by: INTERNAL MEDICINE

## 2023-05-16 PROCEDURE — 84439 ASSAY OF FREE THYROXINE: CPT | Performed by: INTERNAL MEDICINE

## 2023-05-16 PROCEDURE — 36415 COLL VENOUS BLD VENIPUNCTURE: CPT | Mod: S$GLB,,, | Performed by: INTERNAL MEDICINE

## 2023-05-16 PROCEDURE — 82306 VITAMIN D 25 HYDROXY: CPT | Performed by: INTERNAL MEDICINE

## 2023-05-16 PROCEDURE — 85025 COMPLETE CBC W/AUTO DIFF WBC: CPT | Performed by: INTERNAL MEDICINE

## 2023-05-16 PROCEDURE — 83036 HEMOGLOBIN GLYCOSYLATED A1C: CPT | Performed by: INTERNAL MEDICINE

## 2023-05-16 PROCEDURE — 84443 ASSAY THYROID STIM HORMONE: CPT | Performed by: INTERNAL MEDICINE

## 2023-05-16 PROCEDURE — 84481 FREE ASSAY (FT-3): CPT | Performed by: INTERNAL MEDICINE

## 2023-05-16 PROCEDURE — 80061 LIPID PANEL: CPT | Performed by: INTERNAL MEDICINE

## 2023-05-16 PROCEDURE — 80053 COMPREHEN METABOLIC PANEL: CPT | Performed by: INTERNAL MEDICINE

## 2023-05-16 PROCEDURE — 82607 VITAMIN B-12: CPT | Performed by: INTERNAL MEDICINE

## 2023-06-12 ENCOUNTER — TELEPHONE (OUTPATIENT)
Dept: FAMILY MEDICINE | Facility: CLINIC | Age: 78
End: 2023-06-12
Payer: MEDICARE

## 2023-06-12 NOTE — TELEPHONE ENCOUNTER
----- Message from Shantell Pelaez sent at 6/12/2023  7:37 AM CDT -----  Type:  Sooner Appointment Request    Caller is requesting a sooner appointment.  Caller declined first available appointment listed below.  Caller will not accept being placed on the waitlist and is requesting a message be sent to doctor.    Name of Caller:  pt  When is the first available appointment?  6/14--but she will be out of town  Symptoms:  Review bloodwork  Best Call Back Number:  [phone] or [phone] (home)     Additional Information:  thank you

## 2023-06-15 DIAGNOSIS — E78.2 MIXED HYPERLIPIDEMIA: ICD-10-CM

## 2023-06-15 RX ORDER — PRAVASTATIN SODIUM 80 MG/1
80 TABLET ORAL NIGHTLY
Qty: 90 TABLET | Refills: 1 | Status: SHIPPED | OUTPATIENT
Start: 2023-06-15 | End: 2023-09-25 | Stop reason: SDUPTHER

## 2023-06-15 NOTE — TELEPHONE ENCOUNTER
No care due was identified.  [facility] Embedded Care Due Messages. Reference number: [id number].   6/15/2023 1:41:47 PM CDT

## 2023-06-15 NOTE — TELEPHONE ENCOUNTER
Refill Decision Note   Tiffanie Wayne  is requesting a refill authorization.  Brief Assessment and Rationale for Refill:  Approve     Medication Therapy Plan:         Comments:     Note composed:2:05 PM 06/15/2023             Appointments     Last Visit   11/17/2022 Dionisio Soares MD   Next Visit   7/6/2023 Dionisio Soares MD

## 2023-07-06 ENCOUNTER — OFFICE VISIT (OUTPATIENT)
Dept: FAMILY MEDICINE | Facility: CLINIC | Age: 78
End: 2023-07-06
Payer: MEDICARE

## 2023-07-06 VITALS
RESPIRATION RATE: 16 BRPM | HEART RATE: 75 BPM | DIASTOLIC BLOOD PRESSURE: 82 MMHG | WEIGHT: 122.44 LBS | OXYGEN SATURATION: 96 % | HEIGHT: 64 IN | BODY MASS INDEX: 20.9 KG/M2 | SYSTOLIC BLOOD PRESSURE: 122 MMHG

## 2023-07-06 DIAGNOSIS — M85.859 OSTEOPENIA OF THIGH, UNSPECIFIED LATERALITY: ICD-10-CM

## 2023-07-06 DIAGNOSIS — E78.2 MIXED HYPERLIPIDEMIA: Primary | ICD-10-CM

## 2023-07-06 DIAGNOSIS — Z01.89 ENCOUNTER FOR LABORATORY TEST: ICD-10-CM

## 2023-07-06 DIAGNOSIS — E53.8 VITAMIN B12 DEFICIENCY: ICD-10-CM

## 2023-07-06 DIAGNOSIS — E03.9 HYPOTHYROIDISM, UNSPECIFIED TYPE: ICD-10-CM

## 2023-07-06 DIAGNOSIS — E55.9 VITAMIN D INSUFFICIENCY: ICD-10-CM

## 2023-07-06 DIAGNOSIS — R73.01 IMPAIRED FASTING GLUCOSE: ICD-10-CM

## 2023-07-06 DIAGNOSIS — Z78.0 POST-MENOPAUSAL: ICD-10-CM

## 2023-07-06 PROCEDURE — 99999 PR PBB SHADOW E&M-EST. PATIENT-LVL III: ICD-10-PCS | Mod: PBBFAC,,, | Performed by: INTERNAL MEDICINE

## 2023-07-06 PROCEDURE — 99214 PR OFFICE/OUTPT VISIT, EST, LEVL IV, 30-39 MIN: ICD-10-PCS | Mod: S$PBB,,, | Performed by: INTERNAL MEDICINE

## 2023-07-06 PROCEDURE — 99214 OFFICE O/P EST MOD 30 MIN: CPT | Mod: S$PBB,,, | Performed by: INTERNAL MEDICINE

## 2023-07-06 PROCEDURE — 99213 OFFICE O/P EST LOW 20 MIN: CPT | Mod: PBBFAC,PN | Performed by: INTERNAL MEDICINE

## 2023-07-06 PROCEDURE — 99999 PR PBB SHADOW E&M-EST. PATIENT-LVL III: CPT | Mod: PBBFAC,,, | Performed by: INTERNAL MEDICINE

## 2023-07-06 RX ORDER — MONTELUKAST SODIUM 10 MG/1
10 TABLET ORAL NIGHTLY
COMMUNITY
Start: 2023-06-01

## 2023-07-06 RX ORDER — DOXEPIN HYDROCHLORIDE 10 MG/1
10 CAPSULE ORAL
COMMUNITY
Start: 2023-05-08 | End: 2023-07-22

## 2023-07-06 RX ORDER — AMITRIPTYLINE HYDROCHLORIDE 25 MG/1
25 TABLET, FILM COATED ORAL NIGHTLY
COMMUNITY
Start: 2023-06-01

## 2023-07-06 RX ORDER — GABAPENTIN 300 MG/1
CAPSULE ORAL
COMMUNITY
Start: 2023-06-15

## 2023-07-06 RX ORDER — BUDESONIDE AND FORMOTEROL FUMARATE DIHYDRATE 80; 4.5 UG/1; UG/1
2 AEROSOL RESPIRATORY (INHALATION)
COMMUNITY

## 2023-07-06 NOTE — PROGRESS NOTES
Subjective:       Patient ID: Tiffanie Black is a 77 y.o. female.    Chief Complaint: Follow-up and Results  HPI:  Patient here today for reassessment and go over lab work.  Follow-up  Pertinent negatives include no abdominal pain, arthralgias, chest pain, congestion, coughing, fever, myalgias, nausea, rash or vomiting.   Mixed hyperlipidemia: Maintain low fat high fiber diet, exercise regularly. Weight reduction where indicated.  Cont pravastatin at night.  Lipid profile:  Cholesterol 171/triglyceride 93/HDL 52/.4.  Continue pravastatin 80 mg at nighttime  -     Lipid Panel; Future; Expected date: 07/06/2023  -     Comprehensive Metabolic Panel; Future; Expected date: 07/06/2023    Hypothyroidism, unspecified type; same levothyroxine dosing TSH 2.85; free T4 0.86.  And free T3 at 2.7.  Thyroid function test with follow-up labs  -     TSH; Future; Expected date: 07/06/2023  -     T4, Free; Future; Expected date: 07/06/2023  -     T3, Free; Future; Expected date: 07/06/2023    Impaired fasting glucose: Exercise recommended with weight reduction and low carb diet; we'll follow hemoglobin A1c's with you periodically.  -     Comprehensive Metabolic Panel; Future; Expected date: 07/06/2023    Osteopenia of thigh, unspecified laterality; Weight bearing exercises, continue calcium and vit D supplements. DEXA scabn at 2 yr intervals as needed.  DEXA needs to be updated. Last 2/24/21 w fem bone T score -1.4.   -     DXA Bone Density Axial Skeleton 1 or more sites; Future; Expected date: 07/06/2023  -     Vitamin D; Future; Expected date: 07/06/2023  -     Comprehensive Metabolic Panel; Future; Expected date: 07/06/2023  -     TSH; Future; Expected date: 07/06/2023  -     T4, Free; Future; Expected date: 07/06/2023  -     T3, Free; Future; Expected date: 07/06/2023    Post-menopausal  -     DXA Bone Density Axial Skeleton 1 or more sites; Future; Expected date: 07/06/2023    Vitamin D insufficiency: level 37;  "had been skipping vit D some; to take daily.  At 2000 IU per day.  Calcium 1200 taken with 1000 of vitamin D3 daily   -     Vitamin D; Future; Expected date: 07/06/2023    Vitamin B12 deficiency: take regularly. Level 502.  On vitamin B12 at 500 mcg daily  -     Vitamin B12; Future; Expected date: 07/06/2023    Encounter for laboratory test; labs reviewed w pt and ordered for f/u.   -     Lipid Panel; Future; Expected date: 07/06/2023  -     Vitamin D; Future; Expected date: 07/06/2023  -     Comprehensive Metabolic Panel; Future; Expected date: 07/06/2023  -     TSH; Future; Expected date: 07/06/2023  -     T4, Free; Future; Expected date: 07/06/2023  -     T3, Free; Future; Expected date: 07/06/2023      Review of Systems   Constitutional:  Negative for appetite change and fever.   HENT:  Negative for congestion, postnasal drip, rhinorrhea and sinus pressure.    Eyes:  Negative for discharge and itching.   Respiratory:  Negative for cough, chest tightness, shortness of breath and wheezing.    Cardiovascular:  Negative for chest pain, palpitations and leg swelling.   Gastrointestinal:  Negative for abdominal distention, abdominal pain, blood in stool, constipation, diarrhea, nausea and vomiting.   Endocrine: Negative for polydipsia, polyphagia and polyuria.   Genitourinary:  Negative for dysuria and hematuria.   Musculoskeletal:  Negative for arthralgias and myalgias.   Skin:  Negative for rash.   Allergic/Immunologic: Negative for environmental allergies and food allergies.   Neurological:  Negative for tremors, seizures and syncope.   Hematological:  Negative for adenopathy. Does not bruise/bleed easily.   Psychiatric/Behavioral:  Negative for dysphoric mood. The patient is not nervous/anxious.     Objective:        Vitals:    07/06/23 1128   BP: 122/82   BP Location: Left arm   Patient Position: Sitting   Pulse: 75   Resp: 16   SpO2: 96%   Weight: 55.6 kg (122 lb 7.5 oz)   Height: 5' 4" (1.626 m)       BMI " Readings from Last 3 Encounters:   07/06/23 21.02 kg/m²   11/17/22 21.04 kg/m²   07/05/22 20.87 kg/m²        Wt Readings from Last 3 Encounters:   07/06/23 1128 55.6 kg (122 lb 7.5 oz)   11/17/22 1534 55.6 kg (122 lb 9.2 oz)   07/05/22 1010 55.2 kg (121 lb 9.3 oz)        BP Readings from Last 3 Encounters:   07/06/23 122/82   11/17/22 126/76   07/05/22 124/78        There are no preventive care reminders to display for this patient.     Health Maintenance Due   Topic Date Due    COVID-19 Vaccine (6 - Pfizer series) 04/29/2023         Past Medical History:   Diagnosis Date    Cancer 2008    left breast    Chemotherapy induced neutropenia     Hypothyroidism     Impaired fasting glucose     Mixed hyperlipidemia     Osteopenia     Pre-hypertension 4/27/2017    Seasonal allergies     Vitamin B12 deficiency     Vitamin D insufficiency        Past Surgical History:   Procedure Laterality Date    Bilateral mastectomy      BLADDER SUSPENSION  08/2012    Done same time as her Hysterectomy w BSO; Dr Smith her Gyn.     COLONOSCOPY  07/29/2015    Dr Scott; divert ds descending/sigmoid ; 5 yr f/u;.2020 due.    HYSTERECTOMY  08/2012    Robotic; w BSO; Dr Smith Gyn did her surgery. Bladder suspension done at same time.     JOINT REPLACEMENT Right 08/2015    Right hip total replaced. Dr Jaleesa kothari did her surgery       Social History     Tobacco Use    Smoking status: Former     Types: Cigarettes    Smokeless tobacco: Never   Substance Use Topics    Alcohol use: Yes     Alcohol/week: 2.0 - 3.0 standard drinks     Types: 2 - 3 Glasses of wine per week     Comment: nightly    Drug use: No       No family history on file.    Review of patient's allergies indicates:   Allergen Reactions    Cleocin [clindamycin hcl] Itching and Rash    Bactrim [sulfamethoxazole-trimethoprim] Hives    Biaxin [clarithromycin] Hives       Current Outpatient Medications on File Prior to Visit   Medication Sig Dispense Refill    amitriptyline (ELAVIL) 25  MG tablet Take 25 mg by mouth every evening.      budesonide-formoterol 80-4.5 mcg (SYMBICORT) 80-4.5 mcg/actuation HFAA Inhale 2 puffs into the lungs. Controller      calcium carbonate/vitamin D3 (CALCIUM 600 + D,3, ORAL) Take 1 tablet by mouth once daily. TAKES CALCIUM 1200MG +D3 1000IU TABLET DAILY      cholecalciferol, vitamin D3, 2,000 unit Tab Take 2,000 mg by mouth once daily.      clobetasol (OLUX) 0.05 % Foam APPLY TO AFFECTED AREA on scalp DAILY AS NEEDED  11    cyanocobalamin 500 MCG tablet Take 500 mcg by mouth once daily.      doxepin (SINEQUAN) 10 MG capsule Take 10 mg by mouth. Finishing medication in 2 days then starts amitriptyline      fluticasone propionate (FLONASE) 50 mcg/actuation nasal spray 1 spray by Each Nare route once daily.      gabapentin (NEURONTIN) 300 MG capsule TAKE 1 CAPSULE OR TAKE 2 CAPSULES AT BEDTIME UP TO 3 TIMES DAILY      ketoconazole (NIZORAL) 2 % shampoo apply to scalp leave on for 5 minutes then rinse 3times a week  11    levothyroxine (SYNTHROID) 75 MCG tablet Take 0.5 tablets (37.5 mcg total) by mouth before breakfast. 45 tablet 2    montelukast (SINGULAIR) 10 mg tablet Take 10 mg by mouth every evening.      pravastatin (PRAVACHOL) 80 MG tablet Take 1 tablet (80 mg total) by mouth every evening. 90 tablet 1    [DISCONTINUED] benzonatate (TESSALON) 100 MG capsule 100-200 mg po TID as needed for cough; generic 42 capsule 1    [DISCONTINUED] predniSONE (DELTASONE) 20 MG tablet 20 mg po BID for 2 days; then 30 mg po daily for 2 days; then 20 mg po daily for 3 days; then stop. Generic 12 tablet 0    [DISCONTINUED] promethazine-dextromethorphan (PROMETHAZINE-DM) 6.25-15 mg/5 mL Syrp Take 5 mLs by mouth every 4 (four) hours as needed (cough /spasm). Max 4x a 24 hr period. Generic 240 mL 0     No current facility-administered medications on file prior to visit.       Physical Exam  Vitals reviewed.   Constitutional:       Appearance: She is well-developed.   HENT:      Head:  Normocephalic and atraumatic.   Neck:      Thyroid: No thyromegaly.      Vascular: No carotid bruit.   Cardiovascular:      Rate and Rhythm: Normal rate and regular rhythm.      Heart sounds: Normal heart sounds. No murmur heard.    No gallop.   Pulmonary:      Effort: Pulmonary effort is normal. No respiratory distress.      Breath sounds: Normal breath sounds. No wheezing or rales.   Abdominal:      General: Bowel sounds are normal. There is no distension.      Palpations: Abdomen is soft.      Tenderness: There is no abdominal tenderness. There is no guarding or rebound.   Musculoskeletal:         General: Normal range of motion.      Cervical back: Normal range of motion and neck supple.      Right lower leg: No edema.      Left lower leg: No edema.   Lymphadenopathy:      Cervical: No cervical adenopathy.   Skin:     Findings: No rash.   Neurological:      Mental Status: She is alert and oriented to person, place, and time.      Comments: Moves all 4 extremities fine.   Psychiatric:         Behavior: Behavior normal.         Thought Content: Thought content normal.       Assessment:       1. Mixed hyperlipidemia    2. Hypothyroidism, unspecified type    3. Impaired fasting glucose    4. Osteopenia of thigh, unspecified laterality    5. Post-menopausal    6. Vitamin D insufficiency    7. Vitamin B12 deficiency    8. Encounter for laboratory test        Plan:       Mixed hyperlipidemia: Maintain low fat high fiber diet, exercise regularly. Weight reduction where indicated.  Cont pravastatin at night.  Lipid profile:  Cholesterol 171/triglyceride 93/HDL 52/.4.  Continue pravastatin 80 mg at nighttime  -     Lipid Panel; Future; Expected date: 07/06/2023  -     Comprehensive Metabolic Panel; Future; Expected date: 07/06/2023    Hypothyroidism, unspecified type; same levothyroxine dosing TSH 2.85; free T4 0.86.  And free T3 at 2.7.  Thyroid function test with follow-up labs  -     TSH; Future; Expected date:  07/06/2023  -     T4, Free; Future; Expected date: 07/06/2023  -     T3, Free; Future; Expected date: 07/06/2023    Impaired fasting glucose: Exercise recommended with weight reduction and low carb diet; we'll follow hemoglobin A1c's with you periodically.  -     Comprehensive Metabolic Panel; Future; Expected date: 07/06/2023    Osteopenia of thigh, unspecified laterality; Weight bearing exercises, continue calcium and vit D supplements. DEXA scabn at 2 yr intervals as needed.  DEXA needs to be updated. Last 2/24/21 w fem bone T score -1.4.   -     DXA Bone Density Axial Skeleton 1 or more sites; Future; Expected date: 07/06/2023  -     Vitamin D; Future; Expected date: 07/06/2023  -     Comprehensive Metabolic Panel; Future; Expected date: 07/06/2023  -     TSH; Future; Expected date: 07/06/2023  -     T4, Free; Future; Expected date: 07/06/2023  -     T3, Free; Future; Expected date: 07/06/2023    Post-menopausal  -     DXA Bone Density Axial Skeleton 1 or more sites; Future; Expected date: 07/06/2023    Vitamin D insufficiency: level 37; had been skipping vit D some; to take daily.  At 2000 IU per day.  Calcium 1200 taken with 1000 of vitamin D3 daily   -     Vitamin D; Future; Expected date: 07/06/2023    Vitamin B12 deficiency: take regularly. Level 502.   -     Vitamin B12; Future; Expected date: 07/06/2023    Encounter for laboratory test; labs reviewed w pt and ordered for f/u.   -     Lipid Panel; Future; Expected date: 07/06/2023  -     Vitamin D; Future; Expected date: 07/06/2023  -     Comprehensive Metabolic Panel; Future; Expected date: 07/06/2023  -     TSH; Future; Expected date: 07/06/2023  -     T4, Free; Future; Expected date: 07/06/2023  -     T3, Free; Future; Expected date: 07/06/2023

## 2023-07-06 NOTE — PATIENT INSTRUCTIONS
Mixed hyperlipidemia: Maintain low fat high fiber diet, exercise regularly. Weight reduction where indicated.  Cont pravastatin at night.   -     Lipid Panel; Future; Expected date: 07/06/2023  -     Comprehensive Metabolic Panel; Future; Expected date: 07/06/2023    Hypothyroidism, unspecified type; same levothyroxine dosing TSH 2.85; free T4 0.86.   -     TSH; Future; Expected date: 07/06/2023  -     T4, Free; Future; Expected date: 07/06/2023  -     T3, Free; Future; Expected date: 07/06/2023    Impaired fasting glucose: Exercise recommended with weight reduction and low carb diet; we'll follow hemoglobin A1c's with you periodically.  -     Comprehensive Metabolic Panel; Future; Expected date: 07/06/2023    Osteopenia of thigh, unspecified laterality; Weight bearing exercises, continue calcium and vit D supplements. DEXA scabn at 2 yr intervals as needed.  DEXA needs to be updated. Last 2/24/23 w fem bone Tscore -1.4.   -     DXA Bone Density Axial Skeleton 1 or more sites; Future; Expected date: 07/06/2023  -     Vitamin D; Future; Expected date: 07/06/2023  -     Comprehensive Metabolic Panel; Future; Expected date: 07/06/2023  -     TSH; Future; Expected date: 07/06/2023  -     T4, Free; Future; Expected date: 07/06/2023  -     T3, Free; Future; Expected date: 07/06/2023    Post-menopausal  -     DXA Bone Density Axial Skeleton 1 or more sites; Future; Expected date: 07/06/2023    Vitamin D insufficiency: level 37; had been skipping vit D some; to take daily.   -     Vitamin D; Future; Expected date: 07/06/2023    Vitamin B12 deficiency: take regularly. Level 502.   -     Vitamin B12; Future; Expected date: 07/06/2023    Encounter for laboratory test; labs reviewed w pt and ordered for f/u.   -     Lipid Panel; Future; Expected date: 07/06/2023  -     Vitamin D; Future; Expected date: 07/06/2023  -     Comprehensive Metabolic Panel; Future; Expected date: 07/06/2023  -     TSH; Future; Expected date:  07/06/2023  -     T4, Free; Future; Expected date: 07/06/2023  -     T3, Free; Future; Expected date: 07/06/2023

## 2023-07-22 ENCOUNTER — TELEPHONE (OUTPATIENT)
Dept: FAMILY MEDICINE | Facility: CLINIC | Age: 78
End: 2023-07-22
Payer: MEDICARE

## 2023-07-23 NOTE — TELEPHONE ENCOUNTER
Please call patient and ask her to schedule appointment sometime within the next couple of months to get established with Dr EDREK Bray, and go over her DEXA scan results.  Please schedule a doctor appointment within a couple weeks of her DEXA scan results.  Please inform patient I will be retiring with my last day being 08/31/2023 at [facility].  She will also need to schedule appointment with her in 5 and half months for her repeat labs as well and for reassessment  I do not see that this has been scheduled yet she has labs already ordered for that visit

## 2023-08-15 ENCOUNTER — PES CALL (OUTPATIENT)
Dept: ADMINISTRATIVE | Facility: CLINIC | Age: 78
End: 2023-08-15
Payer: MEDICARE

## 2023-09-25 DIAGNOSIS — E03.9 HYPOTHYROIDISM, UNSPECIFIED TYPE: ICD-10-CM

## 2023-09-25 DIAGNOSIS — E78.2 MIXED HYPERLIPIDEMIA: ICD-10-CM

## 2023-09-25 RX ORDER — PRAVASTATIN SODIUM 80 MG/1
80 TABLET ORAL NIGHTLY
Qty: 90 TABLET | Refills: 1 | Status: SHIPPED | OUTPATIENT
Start: 2023-09-25 | End: 2024-01-18 | Stop reason: SDUPTHER

## 2023-09-25 RX ORDER — LEVOTHYROXINE SODIUM 75 UG/1
37.5 TABLET ORAL
Qty: 45 TABLET | Refills: 2 | Status: SHIPPED | OUTPATIENT
Start: 2023-09-25 | End: 2024-01-18 | Stop reason: SDUPTHER

## 2023-09-25 RX ORDER — LEVOTHYROXINE SODIUM 75 UG/1
37.5 TABLET ORAL
Qty: 45 TABLET | Refills: 2 | OUTPATIENT
Start: 2023-09-25

## 2024-01-18 DIAGNOSIS — E78.2 MIXED HYPERLIPIDEMIA: ICD-10-CM

## 2024-01-18 DIAGNOSIS — E03.9 HYPOTHYROIDISM, UNSPECIFIED TYPE: ICD-10-CM

## 2024-01-26 RX ORDER — PRAVASTATIN SODIUM 80 MG/1
80 TABLET ORAL NIGHTLY
Qty: 90 TABLET | Refills: 1 | Status: SHIPPED | OUTPATIENT
Start: 2024-01-26

## 2024-01-26 RX ORDER — LEVOTHYROXINE SODIUM 75 UG/1
37.5 TABLET ORAL
Qty: 45 TABLET | Refills: 2 | Status: SHIPPED | OUTPATIENT
Start: 2024-01-26

## 2024-05-15 ENCOUNTER — OFFICE VISIT (OUTPATIENT)
Dept: FAMILY MEDICINE | Facility: CLINIC | Age: 79
End: 2024-05-15
Payer: MEDICARE

## 2024-05-15 VITALS
RESPIRATION RATE: 18 BRPM | WEIGHT: 117.94 LBS | HEART RATE: 70 BPM | BODY MASS INDEX: 20.25 KG/M2 | OXYGEN SATURATION: 97 % | DIASTOLIC BLOOD PRESSURE: 84 MMHG | SYSTOLIC BLOOD PRESSURE: 136 MMHG

## 2024-05-15 DIAGNOSIS — E55.9 VITAMIN D INSUFFICIENCY: ICD-10-CM

## 2024-05-15 DIAGNOSIS — R73.01 IMPAIRED FASTING GLUCOSE: ICD-10-CM

## 2024-05-15 DIAGNOSIS — E03.9 HYPOTHYROIDISM, UNSPECIFIED TYPE: ICD-10-CM

## 2024-05-15 DIAGNOSIS — E78.2 MIXED HYPERLIPIDEMIA: ICD-10-CM

## 2024-05-15 DIAGNOSIS — E53.8 VITAMIN B12 DEFICIENCY: ICD-10-CM

## 2024-05-15 DIAGNOSIS — Z00.00 PREVENTATIVE HEALTH CARE: Primary | ICD-10-CM

## 2024-05-15 PROBLEM — E74.31 SUCRASE-ISOMALTASE DEFICIENCY: Status: ACTIVE | Noted: 2024-05-15

## 2024-05-15 PROBLEM — H04.123 DRY EYE SYNDROME OF BILATERAL LACRIMAL GLANDS: Chronic | Status: ACTIVE | Noted: 2024-05-15

## 2024-05-15 PROBLEM — E74.31 SUCRASE-ISOMALTASE DEFICIENCY: Status: RESOLVED | Noted: 2024-05-15 | Resolved: 2024-05-15

## 2024-05-15 PROBLEM — R03.0 PRE-HYPERTENSION: Status: RESOLVED | Noted: 2017-04-27 | Resolved: 2024-05-15

## 2024-05-15 PROBLEM — K57.32 DIVERTICULITIS LARGE INTESTINE W/O PERFORATION OR ABSCESS W/O BLEEDING: Status: RESOLVED | Noted: 2024-05-15 | Resolved: 2024-05-15

## 2024-05-15 PROBLEM — K58.0 IRRITABLE BOWEL SYNDROME WITH DIARRHEA: Status: RESOLVED | Noted: 2024-05-15 | Resolved: 2024-05-15

## 2024-05-15 PROBLEM — K57.32 DIVERTICULITIS LARGE INTESTINE W/O PERFORATION OR ABSCESS W/O BLEEDING: Chronic | Status: ACTIVE | Noted: 2024-05-15

## 2024-05-15 PROBLEM — C50.812 MALIGNANT NEOPLASM OF OVERLAPPING SITES OF LEFT FEMALE BREAST: Status: RESOLVED | Noted: 2017-05-24 | Resolved: 2024-05-15

## 2024-05-15 PROBLEM — M47.816 LUMBAR SPONDYLOSIS: Status: ACTIVE | Noted: 2024-05-08

## 2024-05-15 PROBLEM — H72.92 EAR DRUM PERFORATION, LEFT: Status: RESOLVED | Noted: 2022-10-03 | Resolved: 2024-05-15

## 2024-05-15 PROBLEM — K58.0 IRRITABLE BOWEL SYNDROME WITH DIARRHEA: Chronic | Status: ACTIVE | Noted: 2024-05-15

## 2024-05-15 PROBLEM — M47.816 LUMBAR SPONDYLOSIS: Status: RESOLVED | Noted: 2024-05-08 | Resolved: 2024-05-15

## 2024-05-15 PROBLEM — K57.32 DIVERTICULITIS LARGE INTESTINE W/O PERFORATION OR ABSCESS W/O BLEEDING: Status: ACTIVE | Noted: 2024-05-15

## 2024-05-15 PROBLEM — H72.92 EAR DRUM PERFORATION, LEFT: Status: ACTIVE | Noted: 2022-10-03

## 2024-05-15 PROBLEM — K58.0 IRRITABLE BOWEL SYNDROME WITH DIARRHEA: Status: ACTIVE | Noted: 2024-05-15

## 2024-05-15 PROBLEM — E74.31 SUCRASE-ISOMALTASE DEFICIENCY: Chronic | Status: ACTIVE | Noted: 2024-05-15

## 2024-05-15 PROCEDURE — 99999 PR PBB SHADOW E&M-EST. PATIENT-LVL III: CPT | Mod: PBBFAC,,, | Performed by: STUDENT IN AN ORGANIZED HEALTH CARE EDUCATION/TRAINING PROGRAM

## 2024-05-15 PROCEDURE — 99213 OFFICE O/P EST LOW 20 MIN: CPT | Mod: PBBFAC,PN | Performed by: STUDENT IN AN ORGANIZED HEALTH CARE EDUCATION/TRAINING PROGRAM

## 2024-05-15 PROCEDURE — 99397 PER PM REEVAL EST PAT 65+ YR: CPT | Mod: S$PBB,GZ,, | Performed by: STUDENT IN AN ORGANIZED HEALTH CARE EDUCATION/TRAINING PROGRAM

## 2024-05-15 RX ORDER — PANTOPRAZOLE SODIUM 40 MG/1
TABLET, DELAYED RELEASE ORAL
COMMUNITY
End: 2024-05-15

## 2024-05-15 RX ORDER — ALPRAZOLAM 0.25 MG/1
1 TABLET ORAL EVERY 6 HOURS PRN
COMMUNITY
End: 2024-05-15

## 2024-05-15 NOTE — PROGRESS NOTES
Plan:      Tiffanie was seen today for establish care.    Diagnoses and all orders for this visit:    Preventative health care: Discussed age appropriate preventative healthcare items such as cancer screenings and recommended immunizations. Discussed whether patient is using tobacco, alcohol, or illicit drugs and any concerns were discussed. Discussed maintenance of a healthy weight. Patient queried if she has any additional questions about preventative healthcare and all questions were answered.  -     Hemoglobin A1C; Future  -     Lipid Panel; Future  -     Comprehensive Metabolic Panel; Future  -     CBC Auto Differential; Future  -     TSH; Future  -     Vitamin B12; Future  -     Vitamin D; Future    Mixed hyperlipidemia  -     Lipid Panel; Future  -     Comprehensive Metabolic Panel; Future    Impaired fasting glucose  -     Hemoglobin A1C; Future  -     Comprehensive Metabolic Panel; Future    Hypothyroidism, unspecified type  -     Comprehensive Metabolic Panel; Future  -     TSH; Future    Vitamin D insufficiency  -     Comprehensive Metabolic Panel; Future  -     Vitamin D; Future    Vitamin B12 deficiency  -     CBC Auto Differential; Future  -     Vitamin B12; Future    Follow-up pending lab results.    Tatianna Stahl MD  05/15/2024    Subjective:      Patient ID: Tiffanie Black is a 78 y.o. female    Chief Complaint   Patient presents with    [facility]     HPI  78 y.o. female with a PMHx as documented below presents to clinic today for the following:    HLD:   - Pravastatin 80 mg qhs    Hypothyroidism:   - Synthroid 37.5 mcg daily     Following w/ ENT (/ Jong) for chronic cough, found to be 2/2 nerve irritation following COVID-19 infection. Symptoms resolved w/ oral steroids + gabapentin.    Hx of breast cancer - yearly f/u with Oncology this summer.     ROS  Constitutional:  Negative for chills and fever.   Respiratory:  Negative for shortness of breath.    Cardiovascular:  Negative  for chest pain.   Gastrointestinal:  Negative for abdominal pain, constipation, diarrhea, nausea and vomiting.     Current Outpatient Medications   Medication Instructions    calcium carbonate/vitamin D3 (CALCIUM 600 + D,3, ORAL) 1 tablet, Oral, Daily, TAKES CALCIUM 1200MG +D3 1000IU TABLET DAILY    cholecalciferol (vitamin D3) (VITAMIN D3) 2,000 mg, Oral, Daily    cyanocobalamin 500 mcg, Oral, Daily    levothyroxine (SYNTHROID) 37.5 mcg, Oral, Before breakfast    pravastatin (PRAVACHOL) 80 mg, Oral, Nightly      Past Medical History:   Diagnosis Date    Chemotherapy induced neutropenia     Diverticulitis large intestine w/o perforation or abscess w/o bleeding 05/15/2024    Dry eye syndrome of bilateral lacrimal glands 05/15/2024    Ear drum perforation, left 10/03/2022    Hypothyroidism     Impaired fasting glucose     Irritable bowel syndrome with diarrhea 05/15/2024    Lumbar spondylosis 05/08/2024    Malignant neoplasm of overlapping sites of left female breast 05/24/2017    Dx: 2008    Mixed hyperlipidemia     Osteopenia     Pre-hypertension 04/27/2017    Seasonal allergies     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    Physical Exam   Constitutional:       General: No acute distress.  HENT:      Head: Normocephalic and atraumatic.   Pulmonary:      Effort: Pulmonary effort is normal. No respiratory distress.   Neurological:      General: No focal deficit present.      Mental Status: Alert and oriented to person, place, and time. Mental status is at baseline.    Assessment:       1. Preventative health care    2. Mixed hyperlipidemia    3. Impaired fasting glucose    4. Hypothyroidism, unspecified type    5. Vitamin D insufficiency    6.  Vitamin B12 deficiency        Tatianna Stahl MD  [facility]  Office: [phone]   Fax: [phone]  05/15/2024      Disclaimer: This note was partly generated using dictation software which may occasionally result in transcription errors.    Total time spent on this encounter includes face to face time and non-face to face time preparing to see the patient (eg, review of tests), obtaining and/or reviewing separately obtained history, documenting clinical information in the electronic or other health record, independently interpreting results, and communicating results to the patient/family/caregiver, or care coordinator.      Visit today included increased complexity associated with the care of the episodic problem (see above) addressed and managing the longitudinal care of the patient due to the serious and/or complex managed problem(s) (see above).

## 2024-05-23 ENCOUNTER — LAB VISIT (OUTPATIENT)
Dept: PRIMARY CARE CLINIC | Facility: CLINIC | Age: 79
End: 2024-05-23
Payer: MEDICARE

## 2024-05-23 DIAGNOSIS — Z00.00 PREVENTATIVE HEALTH CARE: ICD-10-CM

## 2024-05-23 DIAGNOSIS — E03.9 HYPOTHYROIDISM, UNSPECIFIED TYPE: ICD-10-CM

## 2024-05-23 DIAGNOSIS — E55.9 VITAMIN D INSUFFICIENCY: ICD-10-CM

## 2024-05-23 DIAGNOSIS — E78.2 MIXED HYPERLIPIDEMIA: ICD-10-CM

## 2024-05-23 DIAGNOSIS — R73.01 IMPAIRED FASTING GLUCOSE: ICD-10-CM

## 2024-05-23 DIAGNOSIS — E53.8 VITAMIN B12 DEFICIENCY: ICD-10-CM

## 2024-05-23 LAB
25(OH)D3+25(OH)D2 SERPL-MCNC: 38 NG/ML (ref 30–96)
ALBUMIN SERPL BCP-MCNC: 3.7 G/DL (ref 3.5–5.2)
ALP SERPL-CCNC: 64 U/L (ref 55–135)
ALT SERPL W/O P-5'-P-CCNC: 18 U/L (ref 10–44)
ANION GAP SERPL CALC-SCNC: 6 MMOL/L (ref 8–16)
AST SERPL-CCNC: 19 U/L (ref 10–40)
BASOPHILS # BLD AUTO: 0.02 K/UL (ref 0–0.2)
BASOPHILS NFR BLD: 0.5 % (ref 0–1.9)
BILIRUB SERPL-MCNC: 0.5 MG/DL (ref 0.1–1)
BUN SERPL-MCNC: 15 MG/DL (ref 8–23)
CALCIUM SERPL-MCNC: 9.1 MG/DL (ref 8.7–10.5)
CHLORIDE SERPL-SCNC: 106 MMOL/L (ref 95–110)
CHOLEST SERPL-MCNC: 175 MG/DL (ref 120–199)
CHOLEST/HDLC SERPL: 3.4 {RATIO} (ref 2–5)
CO2 SERPL-SCNC: 29 MMOL/L (ref 23–29)
CREAT SERPL-MCNC: 0.7 MG/DL (ref 0.5–1.4)
DIFFERENTIAL METHOD BLD: ABNORMAL
EOSINOPHIL # BLD AUTO: 0.2 K/UL (ref 0–0.5)
EOSINOPHIL NFR BLD: 5.7 % (ref 0–8)
ERYTHROCYTE [DISTWIDTH] IN BLOOD BY AUTOMATED COUNT: 12.9 % (ref 11.5–14.5)
EST. GFR  (NO RACE VARIABLE): >60 ML/MIN/1.73 M^2
ESTIMATED AVG GLUCOSE: 108 MG/DL (ref 68–131)
GLUCOSE SERPL-MCNC: 96 MG/DL (ref 70–110)
HBA1C MFR BLD: 5.4 % (ref 4–5.6)
HCT VFR BLD AUTO: 43 % (ref 37–48.5)
HDLC SERPL-MCNC: 52 MG/DL (ref 40–75)
HDLC SERPL: 29.7 % (ref 20–50)
HGB BLD-MCNC: 13.8 G/DL (ref 12–16)
IMM GRANULOCYTES # BLD AUTO: 0 K/UL (ref 0–0.04)
IMM GRANULOCYTES NFR BLD AUTO: 0 % (ref 0–0.5)
LDLC SERPL CALC-MCNC: 102.2 MG/DL (ref 63–159)
LYMPHOCYTES # BLD AUTO: 1.2 K/UL (ref 1–4.8)
LYMPHOCYTES NFR BLD: 31.3 % (ref 18–48)
MCH RBC QN AUTO: 30.7 PG (ref 27–31)
MCHC RBC AUTO-ENTMCNC: 32.1 G/DL (ref 32–36)
MCV RBC AUTO: 96 FL (ref 82–98)
MONOCYTES # BLD AUTO: 0.4 K/UL (ref 0.3–1)
MONOCYTES NFR BLD: 11.7 % (ref 4–15)
NEUTROPHILS # BLD AUTO: 1.9 K/UL (ref 1.8–7.7)
NEUTROPHILS NFR BLD: 50.8 % (ref 38–73)
NONHDLC SERPL-MCNC: 123 MG/DL
NRBC BLD-RTO: 0 /100 WBC
PLATELET # BLD AUTO: 200 K/UL (ref 150–450)
PMV BLD AUTO: 10.8 FL (ref 9.2–12.9)
POTASSIUM SERPL-SCNC: 3.9 MMOL/L (ref 3.5–5.1)
PROT SERPL-MCNC: 6.5 G/DL (ref 6–8.4)
RBC # BLD AUTO: 4.5 M/UL (ref 4–5.4)
SODIUM SERPL-SCNC: 141 MMOL/L (ref 136–145)
TRIGL SERPL-MCNC: 104 MG/DL (ref 30–150)
TSH SERPL DL<=0.005 MIU/L-ACNC: 1.59 UIU/ML (ref 0.4–4)
VIT B12 SERPL-MCNC: 459 PG/ML (ref 210–950)
WBC # BLD AUTO: 3.67 K/UL (ref 3.9–12.7)

## 2024-05-23 PROCEDURE — 85025 COMPLETE CBC W/AUTO DIFF WBC: CPT | Performed by: STUDENT IN AN ORGANIZED HEALTH CARE EDUCATION/TRAINING PROGRAM

## 2024-05-23 PROCEDURE — 36415 COLL VENOUS BLD VENIPUNCTURE: CPT | Mod: S$GLB,,, | Performed by: STUDENT IN AN ORGANIZED HEALTH CARE EDUCATION/TRAINING PROGRAM

## 2024-05-23 PROCEDURE — 80053 COMPREHEN METABOLIC PANEL: CPT | Performed by: STUDENT IN AN ORGANIZED HEALTH CARE EDUCATION/TRAINING PROGRAM

## 2024-05-23 PROCEDURE — 84443 ASSAY THYROID STIM HORMONE: CPT | Performed by: STUDENT IN AN ORGANIZED HEALTH CARE EDUCATION/TRAINING PROGRAM

## 2024-05-23 PROCEDURE — 82306 VITAMIN D 25 HYDROXY: CPT | Performed by: STUDENT IN AN ORGANIZED HEALTH CARE EDUCATION/TRAINING PROGRAM

## 2024-05-23 PROCEDURE — 83036 HEMOGLOBIN GLYCOSYLATED A1C: CPT | Performed by: STUDENT IN AN ORGANIZED HEALTH CARE EDUCATION/TRAINING PROGRAM

## 2024-05-23 PROCEDURE — 82607 VITAMIN B-12: CPT | Performed by: STUDENT IN AN ORGANIZED HEALTH CARE EDUCATION/TRAINING PROGRAM

## 2024-05-23 PROCEDURE — 80061 LIPID PANEL: CPT | Performed by: STUDENT IN AN ORGANIZED HEALTH CARE EDUCATION/TRAINING PROGRAM

## 2024-07-10 DIAGNOSIS — E78.2 MIXED HYPERLIPIDEMIA: ICD-10-CM

## 2024-07-10 NOTE — TELEPHONE ENCOUNTER
Refill Routing Note   Medication(s) are not appropriate for processing by [facility] for the following reason(s):        No active prescription written by provider    [facility] action(s):  Defer      Medication Therapy Plan: Last ordered: 1.26.24 by Kelsy Eckert. Patient has recently changed PCP's. Recent OV but no current order under new PCP.      Appointments  past 12m or future 3m with PCP    Date Provider   Last Visit   5/15/2024 Tatianna Stahl MD   Next Visit   Visit date not found Tatianna Stahl MD   ED visits in past 90 days: 0        Note composed:4:28 PM 07/10/2024

## 2024-07-10 NOTE — TELEPHONE ENCOUNTER
No care due was identified.  [facility] Embedded Care Due Messages. Reference number: [id number].   7/10/2024 3:05:30 PM CDT

## 2024-07-11 RX ORDER — PRAVASTATIN SODIUM 80 MG/1
80 TABLET ORAL NIGHTLY
Qty: 90 TABLET | Refills: 3 | Status: SHIPPED | OUTPATIENT
Start: 2024-07-11

## 2024-09-06 DIAGNOSIS — E03.9 HYPOTHYROIDISM, UNSPECIFIED TYPE: ICD-10-CM

## 2024-09-06 NOTE — TELEPHONE ENCOUNTER
No care due was identified.  [facility] Embedded Care Due Messages. Reference number: [id number].   9/06/2024 4:12:21 PM CDT

## 2024-09-07 NOTE — TELEPHONE ENCOUNTER
Refill Routing Note   Medication(s) are not appropriate for processing by [facility] for the following reason(s):        No active prescription written by provider    [facility] action(s):  Defer               Appointments  past 12m or future 3m with PCP    Date Provider   Last Visit   5/15/2024 Tatianna Stahl MD   Next Visit   Visit date not found Tatianna Stahl MD   ED visits in past 90 days: 0        Note composed:3:44 PM 09/07/2024

## 2024-09-09 RX ORDER — LEVOTHYROXINE SODIUM 75 UG/1
37.5 TABLET ORAL
Qty: 45 TABLET | Refills: 3 | Status: SHIPPED | OUTPATIENT
Start: 2024-09-09

## 2024-09-25 DIAGNOSIS — Z00.00 ENCOUNTER FOR MEDICARE ANNUAL WELLNESS EXAM: ICD-10-CM

## 2025-08-19 ENCOUNTER — PATIENT MESSAGE (OUTPATIENT)
Dept: ADMINISTRATIVE | Facility: HOSPITAL | Age: 80
End: 2025-08-19
Payer: MEDICARE